# Patient Record
Sex: FEMALE | Race: WHITE | NOT HISPANIC OR LATINO | Employment: PART TIME | URBAN - METROPOLITAN AREA
[De-identification: names, ages, dates, MRNs, and addresses within clinical notes are randomized per-mention and may not be internally consistent; named-entity substitution may affect disease eponyms.]

---

## 2015-09-29 LAB
EXTERNAL HIV CONFIRMATION: NORMAL
EXTERNAL HIV SCREEN: NORMAL

## 2018-06-04 ENCOUNTER — OFFICE VISIT (OUTPATIENT)
Dept: FAMILY MEDICINE CLINIC | Facility: CLINIC | Age: 28
End: 2018-06-04
Payer: COMMERCIAL

## 2018-06-04 VITALS
RESPIRATION RATE: 18 BRPM | BODY MASS INDEX: 24.03 KG/M2 | HEART RATE: 82 BPM | HEIGHT: 66 IN | WEIGHT: 149.5 LBS | DIASTOLIC BLOOD PRESSURE: 84 MMHG | OXYGEN SATURATION: 98 % | SYSTOLIC BLOOD PRESSURE: 118 MMHG

## 2018-06-04 DIAGNOSIS — G43.109 MIGRAINE WITH AURA AND WITHOUT STATUS MIGRAINOSUS, NOT INTRACTABLE: ICD-10-CM

## 2018-06-04 DIAGNOSIS — Z76.89 ENCOUNTER TO ESTABLISH CARE: Primary | ICD-10-CM

## 2018-06-04 DIAGNOSIS — M25.562 ARTHRALGIA OF BOTH KNEES: ICD-10-CM

## 2018-06-04 DIAGNOSIS — J30.1 NON-SEASONAL ALLERGIC RHINITIS DUE TO POLLEN: ICD-10-CM

## 2018-06-04 DIAGNOSIS — M25.561 ARTHRALGIA OF BOTH KNEES: ICD-10-CM

## 2018-06-04 DIAGNOSIS — Z13.220 NEED FOR LIPID SCREENING: ICD-10-CM

## 2018-06-04 DIAGNOSIS — M06.9 RHEUMATOID ARTHRITIS INVOLVING BOTH KNEES, UNSPECIFIED RHEUMATOID FACTOR PRESENCE: ICD-10-CM

## 2018-06-04 DIAGNOSIS — J45.40 MODERATE PERSISTENT ASTHMA WITHOUT COMPLICATION: ICD-10-CM

## 2018-06-04 DIAGNOSIS — F17.210 CIGARETTE NICOTINE DEPENDENCE WITHOUT COMPLICATION: ICD-10-CM

## 2018-06-04 DIAGNOSIS — Z20.7 SCABIES EXPOSURE: ICD-10-CM

## 2018-06-04 PROBLEM — J45.20 MILD INTERMITTENT ASTHMA WITHOUT COMPLICATION: Status: ACTIVE | Noted: 2018-06-04

## 2018-06-04 PROCEDURE — 99204 OFFICE O/P NEW MOD 45 MIN: CPT | Performed by: NURSE PRACTITIONER

## 2018-06-04 RX ORDER — PERMETHRIN 50 MG/G
CREAM TOPICAL ONCE
Qty: 60 G | Refills: 0 | Status: SHIPPED | OUTPATIENT
Start: 2018-06-04 | End: 2018-06-04

## 2018-06-04 RX ORDER — PERMETHRIN 50 MG/G
CREAM TOPICAL
Refills: 0 | COMMUNITY
Start: 2018-05-22 | End: 2018-06-04 | Stop reason: SDUPTHER

## 2018-06-04 RX ORDER — FLUTICASONE PROPIONATE 220 UG/1
1 AEROSOL, METERED RESPIRATORY (INHALATION) 2 TIMES DAILY
Qty: 1 INHALER | Refills: 5 | Status: SHIPPED | OUTPATIENT
Start: 2018-06-04 | End: 2018-09-24

## 2018-06-04 RX ORDER — TOPIRAMATE 100 MG/1
100 TABLET, FILM COATED ORAL 2 TIMES DAILY
COMMUNITY
End: 2018-06-04

## 2018-06-04 RX ORDER — ALBUTEROL SULFATE 90 UG/1
2 AEROSOL, METERED RESPIRATORY (INHALATION) EVERY 6 HOURS PRN
Qty: 1 INHALER | Refills: 4 | Status: SHIPPED | OUTPATIENT
Start: 2018-06-04 | End: 2018-07-04

## 2018-06-04 RX ORDER — NAPROXEN 500 MG/1
250 TABLET ORAL 2 TIMES DAILY WITH MEALS
COMMUNITY
End: 2018-06-07 | Stop reason: SDUPTHER

## 2018-06-04 NOTE — PROGRESS NOTES
Assessment/Plan:    No problem-specific Assessment & Plan notes found for this encounter  Diagnoses and all orders for this visit:    Encounter to establish care  -     CBC and differential; Future  -     Comprehensive metabolic panel; Future  -     Vitamin D 25 hydroxy; Future  -     TSH, 3rd generation; Future  -     Lipid panel; Future  -     Sedimentation rate, automated; Future    Scabies exposure  -     permethrin (ELIMITE) 5 % cream; Apply topically once for 1 dose  -     Discontinue: permethrin (ELIMITE) 5 % cream; Apply topically once for 1 dose  -     CBC and differential; Future  -     Comprehensive metabolic panel; Future  -     Vitamin D 25 hydroxy; Future  -     Vitamin B12; Future  -     TSH, 3rd generation; Future  -     Lipid panel; Future  -     Sedimentation rate, automated; Future    Migraine with aura and without status migrainosus, not intractable  -     topiramate (TOPAMAX) 200 MG tablet; Take 1 tablet (200 mg total) by mouth 2 (two) times a day for 30 days  -     CBC and differential; Future  -     Comprehensive metabolic panel; Future  -     Vitamin D 25 hydroxy; Future  -     Vitamin B12; Future  -     TSH, 3rd generation; Future  -     Lipid panel; Future  -     Sedimentation rate, automated; Future    Moderate persistent asthma without complication  -     fluticasone (FLOVENT HFA) 220 mcg/act inhaler; Inhale 1 puff 2 (two) times a day for 30 days  -     albuterol (PROVENTIL HFA,VENTOLIN HFA) 90 mcg/act inhaler; Inhale 2 puffs every 6 (six) hours as needed for wheezing for up to 30 days  -     CBC and differential; Future  -     Comprehensive metabolic panel; Future  -     Vitamin D 25 hydroxy; Future  -     Vitamin B12; Future  -     TSH, 3rd generation; Future  -     Lipid panel; Future  -     Sedimentation rate, automated;  Future    Rheumatoid arthritis involving both knees, unspecified rheumatoid factor presence (HCC)  -     CBC and differential; Future  -     Comprehensive metabolic panel; Future  -     Vitamin D 25 hydroxy; Future  -     Vitamin B12; Future  -     TSH, 3rd generation; Future  -     Lipid panel; Future  -     Sedimentation rate, automated; Future    Arthralgia of both knees  -     Vitamin D 25 hydroxy; Future  -     Vitamin B12; Future  -     TSH, 3rd generation; Future  -     Lipid panel; Future  -     Sedimentation rate, automated; Future    Need for lipid screening  -     Lipid panel; Future  -     Sedimentation rate, automated; Future    Non-seasonal allergic rhinitis due to pollen  [facility] Allergy Panel, Adult; Future    Cigarette nicotine dependence without complication    Other orders  -     Discontinue: topiramate (TOPAMAX) 100 mg tablet; Take 100 mg by mouth 2 (two) times a day  -     naproxen (NAPROSYN) 500 mg tablet; Take 250 mg by mouth 2 (two) times a day with meals  -     Discontinue: albuterol 2 mg/5 mL syrup; Take 2 mg by mouth 3 (three) times a day  -     Discontinue: ciclesonide (ALVESCO) 160 MCG/ACT inhaler; Inhale 1 puff 2 (two) times a day  -     Discontinue: permethrin (ELIMITE) 5 % cream;           Subjective:      Patient ID: Gilmer Prakash is a 32 y o  female  Pt is here to become Established with the practice  She hasnt been seen by a doctor for a few years  She needs permit completed for her drivers physical   [facility]   Asthma- dx as a child  She does smoke  She uses the albuterol inhaler two puffs twice a day for wheezing  Uses steroid inhaler for over a year, daily  Nicotine addiction- 1 pack every 3 days  Started smoking at age 21  Migraine headaches- topamax was decreased 6 months while she was in senior care  She previously was taking 200 mg twice a day and was decreased to 100 mg twice a day  Migraine headaches occur daily  Some days are worse than occurs  She does have a visual aura prior to onset  She takes Ibuprofen 600 mg at onset of migraines   She has had migraines since a teenager  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 [employer]  She feels itchy and feels like "something is biting me"  No rash seen  She received her first treatment two weeks ago and was told to repeat in two weeks  She needs refill  Pt has intermittent pain in right side of abdomen  Pain is sharp and intermittent  Doesn't occur every day  Feels fatigued  Pt dx with RA at age 4  Mom says she stopped them because she "was too young to be taking them"  She has joint pains in hands and knees and shoulders  The following portions of the patient's history were reviewed and updated as appropriate:   She  has a past medical history of Allergic; Anxiety; Asthma; Migraine; and Pneumonia  She   Patient Active Problem List    Diagnosis Date Noted    Encounter to establish care 2018    Mild intermittent asthma without complication     Scabies exposure 2018    Migraine with aura and without status migrainosus, not intractable 2018    Moderate persistent asthma without complication     Rheumatoid arthritis involving both knees ([facility] [address] ) 2018    Arthralgia of both knees 2018    Need for lipid screening 2018    Non-seasonal allergic rhinitis due to pollen 2018    Cigarette nicotine dependence without complication      She  has a past surgical history that includes  section and Femur fracture surgery  Her family history includes Asthma in her daughter and son; Diabetes in her mother; Hypertension in her father  She  reports that she has been smoking Cigarettes  She has been smoking about 0 25 packs per day  She has never used smokeless tobacco  She reports that she does not drink alcohol or use drugs    Current Outpatient Prescriptions   Medication Sig Dispense Refill    naproxen (NAPROSYN) 500 mg tablet Take 250 mg by mouth 2 (two) times a day with meals      albuterol (PROVENTIL HFA,VENTOLIN HFA) 90 mcg/act inhaler Inhale 2 puffs every 6 (six) hours as needed for wheezing for up to 30 days 1 Inhaler 4    fluticasone (FLOVENT HFA) 220 mcg/act inhaler Inhale 1 puff 2 (two) times a day for 30 days 1 Inhaler 5    permethrin (ELIMITE) 5 % cream Apply topically once for 1 dose 60 g 0    topiramate (TOPAMAX) 200 MG tablet Take 1 tablet (200 mg total) by mouth 2 (two) times a day for 30 days 60 tablet 5     No current facility-administered medications for this visit  No current outpatient prescriptions on file prior to visit  No current facility-administered medications on file prior to visit  She is allergic to bee venom; ciprofloxacin; doxycycline; and sulfa antibiotics       Review of Systems   Constitutional: Positive for fatigue  Negative for unexpected weight change  HENT: Negative  Eyes: Negative  Respiratory: Positive for chest tightness, shortness of breath and wheezing  Negative for cough  Cardiovascular: Negative  Gastrointestinal: Positive for abdominal pain  Musculoskeletal: Negative  Multiple joint pains  Hands and knees and shoulders   Skin: Negative  Negative for rash  Neurological: Positive for headaches  Psychiatric/Behavioral: Negative  All other systems reviewed and are negative  Objective:      /84 (BP Location: Left arm, Patient Position: Sitting)   Pulse 82   Resp 18   Ht 5' 6" (1 676 m)   Wt 67 8 kg (149 lb 8 oz)   LMP 06/03/2018 (Exact Date)   SpO2 98%   BMI 24 13 kg/m²          Physical Exam   Constitutional: She is oriented to person, place, and time  She appears well-developed and well-nourished  No distress  HENT:   Head: Normocephalic and atraumatic  Right Ear: External ear normal    Left Ear: External ear normal    Nose: Nose normal    Mouth/Throat: Oropharynx is clear and moist    Eyes: Conjunctivae and EOM are normal  Pupils are equal, round, and reactive to light  Neck: Normal range of motion  Neck supple  No thyromegaly present  Cardiovascular: Normal rate, regular rhythm and normal heart sounds  No murmur heard  Pulmonary/Chest: Effort normal and breath sounds normal  No respiratory distress  She has no wheezes  Abdominal: Soft  Bowel sounds are normal  She exhibits no distension  There is tenderness  Tenderness to palpation of right lower quadrant abdomen   Musculoskeletal: Normal range of motion  She exhibits no edema, tenderness or deformity  Lymphadenopathy:     She has no cervical adenopathy  Neurological: She is alert and oriented to person, place, and time  Skin: Skin is warm and dry  No rash noted  No pallor  Psychiatric: She has a normal mood and affect  Her behavior is normal  Judgment and thought content normal    Nursing note and vitals reviewed

## 2018-06-04 NOTE — PATIENT INSTRUCTIONS
Migraine headaches- increase topamax to 200 mg bid  Asthma- moderate persistent  Change steroid inhaler, increase dosage and have pt decrease use of albuterol if possible  Drivers permit- cleared for driving  Abdominal pain- nonfocal exam  Check labs  Nicotine addiction- not ready to quit  Scabies exposure- continue second dose of permethrine  Rheumatoid arthritis - affecting multiple joints  Check RF and sed rate with labs  I will call you with your lab results

## 2018-06-05 ENCOUNTER — LAB (OUTPATIENT)
Dept: LAB | Facility: CLINIC | Age: 28
End: 2018-06-05
Payer: COMMERCIAL

## 2018-06-05 DIAGNOSIS — M25.562 ARTHRALGIA OF BOTH KNEES: ICD-10-CM

## 2018-06-05 DIAGNOSIS — J45.40 MODERATE PERSISTENT ASTHMA WITHOUT COMPLICATION: ICD-10-CM

## 2018-06-05 DIAGNOSIS — M25.561 ARTHRALGIA OF BOTH KNEES: ICD-10-CM

## 2018-06-05 DIAGNOSIS — G43.109 MIGRAINE WITH AURA AND WITHOUT STATUS MIGRAINOSUS, NOT INTRACTABLE: ICD-10-CM

## 2018-06-05 DIAGNOSIS — Z76.89 ENCOUNTER TO ESTABLISH CARE: ICD-10-CM

## 2018-06-05 DIAGNOSIS — Z20.7 SCABIES EXPOSURE: ICD-10-CM

## 2018-06-05 DIAGNOSIS — M06.9 RHEUMATOID ARTHRITIS INVOLVING BOTH KNEES, UNSPECIFIED RHEUMATOID FACTOR PRESENCE: ICD-10-CM

## 2018-06-05 DIAGNOSIS — J30.1 NON-SEASONAL ALLERGIC RHINITIS DUE TO POLLEN: ICD-10-CM

## 2018-06-05 DIAGNOSIS — Z13.220 NEED FOR LIPID SCREENING: ICD-10-CM

## 2018-06-05 LAB
25(OH)D3 SERPL-MCNC: 34.4 NG/ML (ref 30–100)
ALBUMIN SERPL BCP-MCNC: 4.2 G/DL (ref 3.5–5)
ALP SERPL-CCNC: 49 U/L (ref 46–116)
ALT SERPL W P-5'-P-CCNC: 23 U/L (ref 12–78)
ANION GAP SERPL CALCULATED.3IONS-SCNC: 6 MMOL/L (ref 4–13)
AST SERPL W P-5'-P-CCNC: 16 U/L (ref 5–45)
BASOPHILS # BLD AUTO: 0.04 THOUSANDS/ΜL (ref 0–0.1)
BASOPHILS NFR BLD AUTO: 1 % (ref 0–1)
BILIRUB SERPL-MCNC: 0.27 MG/DL (ref 0.2–1)
BUN SERPL-MCNC: 15 MG/DL (ref 5–25)
CALCIUM SERPL-MCNC: 9.1 MG/DL (ref 8.3–10.1)
CHLORIDE SERPL-SCNC: 110 MMOL/L (ref 100–108)
CHOLEST SERPL-MCNC: 119 MG/DL (ref 50–200)
CO2 SERPL-SCNC: 23 MMOL/L (ref 21–32)
CREAT SERPL-MCNC: 0.71 MG/DL (ref 0.6–1.3)
EOSINOPHIL # BLD AUTO: 0.04 THOUSAND/ΜL (ref 0–0.61)
EOSINOPHIL NFR BLD AUTO: 1 % (ref 0–6)
ERYTHROCYTE [DISTWIDTH] IN BLOOD BY AUTOMATED COUNT: 13.1 % (ref 11.6–15.1)
ERYTHROCYTE [SEDIMENTATION RATE] IN BLOOD: 16 MM/HOUR (ref 0–20)
GFR SERPL CREATININE-BSD FRML MDRD: 115 ML/MIN/1.73SQ M
GLUCOSE P FAST SERPL-MCNC: 77 MG/DL (ref 65–99)
HCT VFR BLD AUTO: 40.2 % (ref 34.8–46.1)
HDLC SERPL-MCNC: 40 MG/DL (ref 40–60)
HGB BLD-MCNC: 13.3 G/DL (ref 11.5–15.4)
IMM GRANULOCYTES # BLD AUTO: 0.01 THOUSAND/UL (ref 0–0.2)
IMM GRANULOCYTES NFR BLD AUTO: 0 % (ref 0–2)
LDLC SERPL CALC-MCNC: 65 MG/DL (ref 0–100)
LYMPHOCYTES # BLD AUTO: 1.75 THOUSANDS/ΜL (ref 0.6–4.47)
LYMPHOCYTES NFR BLD AUTO: 30 % (ref 14–44)
MCH RBC QN AUTO: 31.8 PG (ref 26.8–34.3)
MCHC RBC AUTO-ENTMCNC: 33.1 G/DL (ref 31.4–37.4)
MCV RBC AUTO: 96 FL (ref 82–98)
MONOCYTES # BLD AUTO: 0.52 THOUSAND/ΜL (ref 0.17–1.22)
MONOCYTES NFR BLD AUTO: 9 % (ref 4–12)
NEUTROPHILS # BLD AUTO: 3.4 THOUSANDS/ΜL (ref 1.85–7.62)
NEUTS SEG NFR BLD AUTO: 59 % (ref 43–75)
NONHDLC SERPL-MCNC: 79 MG/DL
NRBC BLD AUTO-RTO: 0 /100 WBCS
PLATELET # BLD AUTO: 303 THOUSANDS/UL (ref 149–390)
PMV BLD AUTO: 9.5 FL (ref 8.9–12.7)
POTASSIUM SERPL-SCNC: 4 MMOL/L (ref 3.5–5.3)
PROT SERPL-MCNC: 7.5 G/DL (ref 6.4–8.2)
RBC # BLD AUTO: 4.18 MILLION/UL (ref 3.81–5.12)
SODIUM SERPL-SCNC: 139 MMOL/L (ref 136–145)
TRIGL SERPL-MCNC: 69 MG/DL
TSH SERPL DL<=0.05 MIU/L-ACNC: 0.57 UIU/ML (ref 0.36–3.74)
VIT B12 SERPL-MCNC: 383 PG/ML (ref 100–900)
WBC # BLD AUTO: 5.76 THOUSAND/UL (ref 4.31–10.16)

## 2018-06-05 PROCEDURE — 82785 ASSAY OF IGE: CPT

## 2018-06-05 PROCEDURE — 82607 VITAMIN B-12: CPT

## 2018-06-05 PROCEDURE — 80061 LIPID PANEL: CPT

## 2018-06-05 PROCEDURE — 36415 COLL VENOUS BLD VENIPUNCTURE: CPT

## 2018-06-05 PROCEDURE — 86003 ALLG SPEC IGE CRUDE XTRC EA: CPT

## 2018-06-05 PROCEDURE — 80053 COMPREHEN METABOLIC PANEL: CPT

## 2018-06-05 PROCEDURE — 85025 COMPLETE CBC W/AUTO DIFF WBC: CPT

## 2018-06-05 PROCEDURE — 82306 VITAMIN D 25 HYDROXY: CPT

## 2018-06-05 PROCEDURE — 85652 RBC SED RATE AUTOMATED: CPT

## 2018-06-05 PROCEDURE — 84443 ASSAY THYROID STIM HORMONE: CPT

## 2018-06-05 NOTE — PROGRESS NOTES
Labs have been reviewed and are wnl   Still waiting for Vit D and allergy panel which may take a bit longer

## 2018-06-07 DIAGNOSIS — B86 SCABIES: Primary | ICD-10-CM

## 2018-06-07 DIAGNOSIS — G89.29 OTHER CHRONIC PAIN: Primary | ICD-10-CM

## 2018-06-07 DIAGNOSIS — J45.40 MODERATE PERSISTENT ASTHMA WITHOUT COMPLICATION: ICD-10-CM

## 2018-06-07 LAB
A ALTERNATA IGE QN: <0.1 KUA/I
A FUMIGATUS IGE QN: <0.1 KUA/I
ALLERGEN COMMENT: ABNORMAL
BERMUDA GRASS IGE QN: 3.29 KUA/I
BOXELDER IGE QN: 3.43 KUA/I
C HERBARUM IGE QN: <0.1 KUA/I
CAT DANDER IGE QN: <0.1 KUA/I
CMN PIGWEED IGE QN: 1.23 KUA/I
COMMON RAGWEED IGE QN: 36.9 KUA/I
COTTONWOOD IGE QN: 1.04 KUA/I
D FARINAE IGE QN: <0.1 KUA/I
D PTERONYSS IGE QN: <0.1 KUA/I
DOG DANDER IGE QN: <0.1 KUA/I
LONDON PLANE IGE QN: 1.24 KUA/I
MOUSE URINE PROT IGE QN: <0.1 KUA/I
MT JUNIPER IGE QN: 0.61 KUA/I
MUGWORT IGE QN: 2.07 KUA/I
P NOTATUM IGE QN: <0.1 KUA/I
ROACH IGE QN: <0.1 KUA/I
SHEEP SORREL IGE QN: 2.74 KUA/I
SILVER BIRCH IGE QN: 2 KUA/I
TIMOTHY IGE QN: 12.6 KUA/I
TOTAL IGE SMQN RAST: 152 KU/L (ref 0–113)
WALNUT IGE QN: 4.19 KUA/I
WHITE ASH IGE QN: 4.98 KUA/I
WHITE ELM IGE QN: 1.77 KUA/I
WHITE MULBERRY IGE QN: <0.1 KUA/I
WHITE OAK IGE QN: 2.13 KUA/I

## 2018-06-07 RX ORDER — PERMETHRIN 50 MG/G
CREAM TOPICAL ONCE
Qty: 60 G | Refills: 1 | Status: SHIPPED | OUTPATIENT
Start: 2018-06-07 | End: 2018-06-07

## 2018-06-07 RX ORDER — NAPROXEN 500 MG/1
250 TABLET ORAL 2 TIMES DAILY WITH MEALS
Qty: 60 TABLET | Refills: 2 | Status: SHIPPED | OUTPATIENT
Start: 2018-06-07 | End: 2018-08-27 | Stop reason: SDUPTHER

## 2018-06-07 RX ORDER — ALBUTEROL SULFATE 90 UG/1
2 AEROSOL, METERED RESPIRATORY (INHALATION) EVERY 6 HOURS PRN
Qty: 1 INHALER | Refills: 0 | Status: CANCELLED | OUTPATIENT
Start: 2018-06-07 | End: 2018-07-07

## 2018-06-07 NOTE — TELEPHONE ENCOUNTER
Monty saw Dellia Meth and needs these meds sent in  The inhalers were sent in to [facility] incorrectly  Called back and stated that she did get the inhalers but not the naproxin nor the cream for the scabies

## 2018-08-27 DIAGNOSIS — G89.29 OTHER CHRONIC PAIN: ICD-10-CM

## 2018-08-27 RX ORDER — NAPROXEN 500 MG/1
250 TABLET ORAL 2 TIMES DAILY WITH MEALS
Qty: 60 TABLET | Refills: 1 | Status: SHIPPED | OUTPATIENT
Start: 2018-08-27 | End: 2018-09-24

## 2018-09-12 DIAGNOSIS — G89.29 OTHER CHRONIC PAIN: ICD-10-CM

## 2018-09-12 RX ORDER — NAPROXEN 500 MG/1
250 TABLET ORAL 2 TIMES DAILY WITH MEALS
Qty: 60 TABLET | Refills: 1 | Status: SHIPPED | OUTPATIENT
Start: 2018-09-12 | End: 2018-09-12 | Stop reason: SDUPTHER

## 2018-09-12 RX ORDER — NAPROXEN 500 MG/1
500 TABLET ORAL 2 TIMES DAILY WITH MEALS
Qty: 60 TABLET | Refills: 0 | Status: SHIPPED | OUTPATIENT
Start: 2018-09-12 | End: 2018-10-10 | Stop reason: SDUPTHER

## 2018-09-24 ENCOUNTER — OFFICE VISIT (OUTPATIENT)
Dept: FAMILY MEDICINE CLINIC | Facility: CLINIC | Age: 28
End: 2018-09-24
Payer: COMMERCIAL

## 2018-09-24 VITALS
HEART RATE: 92 BPM | TEMPERATURE: 99.5 F | BODY MASS INDEX: 25.07 KG/M2 | RESPIRATION RATE: 18 BRPM | DIASTOLIC BLOOD PRESSURE: 86 MMHG | OXYGEN SATURATION: 99 % | WEIGHT: 156 LBS | SYSTOLIC BLOOD PRESSURE: 122 MMHG | HEIGHT: 66 IN

## 2018-09-24 DIAGNOSIS — L02.31 CELLULITIS AND ABSCESS OF BUTTOCK: ICD-10-CM

## 2018-09-24 DIAGNOSIS — J06.9 VIRAL UPPER RESPIRATORY TRACT INFECTION: ICD-10-CM

## 2018-09-24 DIAGNOSIS — L02.31 CUTANEOUS ABSCESS OF BUTTOCK: Primary | ICD-10-CM

## 2018-09-24 DIAGNOSIS — L03.317 CELLULITIS AND ABSCESS OF BUTTOCK: ICD-10-CM

## 2018-09-24 PROCEDURE — 3008F BODY MASS INDEX DOCD: CPT | Performed by: NURSE PRACTITIONER

## 2018-09-24 PROCEDURE — 87186 SC STD MICRODIL/AGAR DIL: CPT | Performed by: NURSE PRACTITIONER

## 2018-09-24 PROCEDURE — 10060 I&D ABSCESS SIMPLE/SINGLE: CPT | Performed by: NURSE PRACTITIONER

## 2018-09-24 PROCEDURE — 87070 CULTURE OTHR SPECIMN AEROBIC: CPT | Performed by: NURSE PRACTITIONER

## 2018-09-24 PROCEDURE — 87147 CULTURE TYPE IMMUNOLOGIC: CPT | Performed by: NURSE PRACTITIONER

## 2018-09-24 PROCEDURE — 99214 OFFICE O/P EST MOD 30 MIN: CPT | Performed by: NURSE PRACTITIONER

## 2018-09-24 PROCEDURE — 87205 SMEAR GRAM STAIN: CPT | Performed by: NURSE PRACTITIONER

## 2018-09-24 RX ORDER — SULFAMETHOXAZOLE AND TRIMETHOPRIM 800; 160 MG/1; MG/1
1 TABLET ORAL EVERY 12 HOURS SCHEDULED
Qty: 20 TABLET | Refills: 0 | Status: SHIPPED | OUTPATIENT
Start: 2018-09-24 | End: 2018-10-04

## 2018-09-24 NOTE — PATIENT INSTRUCTIONS
Abscess and cellulitis of buttock- Pt is unsure as to whether there is a sulfa allergy  She would like to proceed with Bactrim  This is used for Staph infections  Apply warm, moist soaks  If this worsens, go to ER for further eval   URI- continue abrahan berumenx

## 2018-09-24 NOTE — PROGRESS NOTES
Assessment/Plan:    No problem-specific Assessment & Plan notes found for this encounter  Diagnoses and all orders for this visit:    Cutaneous abscess of buttock  -     sulfamethoxazole-trimethoprim (BACTRIM DS) 800-160 mg per tablet; Take 1 tablet by mouth every 12 (twelve) hours for 10 days    Cellulitis and abscess of buttock  -     sulfamethoxazole-trimethoprim (BACTRIM DS) 800-160 mg per tablet; Take 1 tablet by mouth every 12 (twelve) hours for 10 days  -     mupirocin (BACTROBAN) 2 % ointment; Apply topically 2 (two) times a day for 5 days  -     Wound culture and Gram stain; Future  -     Wound culture and Gram stain    Viral upper respiratory tract infection    Other orders  -     Incision and Drainage          Subjective:      Patient ID: Shaheen Lynne is a 32 y o  female  Pt is here for evaluation of a possible spider bite to her buttock  She noticed it 1-2 days ago  He mom tried to pop it  Now, she has redness and tenderness and is hot to touch  She also has a cough  Cough is present for a few days  She would like prednisone  She tells me her entire family is sick and on abx  The following portions of the patient's history were reviewed and updated as appropriate:   She  has a past medical history of Allergic; Anxiety; Asthma; Migraine; and Pneumonia    She   Patient Active Problem List    Diagnosis Date Noted    Cellulitis and abscess of buttock 09/24/2018    Viral upper respiratory tract infection 09/24/2018    Encounter to establish care 06/04/2018    Mild intermittent asthma without complication 40/19/5531    Scabies exposure 06/04/2018    Migraine with aura and without status migrainosus, not intractable 06/04/2018    Moderate persistent asthma without complication 24/91/9696    Rheumatoid [facility] involving both knees ([facility] [address] ) 06/04/2018    Arthralgia of both knees 06/04/2018    Need for lipid screening 06/04/2018    Non-seasonal allergic rhinitis due to pollen 2018    Cigarette nicotine dependence without complication      She  has a past surgical history that includes  section and Femur fracture surgery  Her family history includes Asthma in her daughter and son; Diabetes in her mother; Hypertension in her father  She  reports that she has been smoking Cigarettes  She has been smoking about 0 25 packs per day  She has never used smokeless tobacco  She reports that she does not drink alcohol or use drugs  Current Outpatient Prescriptions   Medication Sig Dispense Refill    mupirocin (BACTROBAN) 2 % ointment Apply topically 2 (two) times a day for 5 days 22 g 0    naproxen (NAPROSYN) 500 mg tablet Take 1 tablet (500 mg total) by mouth 2 (two) times a day with meals 60 tablet 0    sulfamethoxazole-trimethoprim (BACTRIM DS) 800-160 mg per tablet Take 1 tablet by mouth every 12 (twelve) hours for 10 days 20 tablet 0    topiramate (TOPAMAX) 200 MG tablet Take 1 tablet (200 mg total) by mouth 2 (two) times a day for 30 days 60 tablet 5     No current facility-administered medications for this visit  Current Outpatient Prescriptions on File Prior to Visit   Medication Sig    naproxen (NAPROSYN) 500 mg tablet Take 1 tablet (500 mg total) by mouth 2 (two) times a day with meals    topiramate (TOPAMAX) 200 MG tablet Take 1 tablet (200 mg total) by mouth 2 (two) times a day for 30 days    [DISCONTINUED] fluticasone (FLOVENT HFA) 220 mcg/act inhaler Inhale 1 puff 2 (two) times a day for 30 days    [DISCONTINUED] naproxen (NAPROSYN) 500 mg tablet Take 0 5 tablets (250 mg total) by mouth 2 (two) times a day with meals     No current facility-administered medications on file prior to visit  She is allergic to diphenhydramine; bee venom; ciprofloxacin; doxycycline; sulfa antibiotics; and sulfasalazine       Review of Systems   Constitutional: Positive for fever  Negative for fatigue  HENT: Positive for congestion      Respiratory: Positive for cough  Cardiovascular: Negative  Gastrointestinal: Negative  Musculoskeletal: Negative  Skin: Positive for wound  insect bite on buttock    Allergic/Immunologic: Negative for immunocompromised state  Neurological: Negative for weakness  Hematological: Negative for adenopathy  All other systems reviewed and are negative  Objective:      /86 (BP Location: Left arm, Patient Position: Sitting)   Pulse 92   Temp 99 5 °F (37 5 °C)   Resp 18   Ht 5' 6" (1 676 m)   Wt 70 8 kg (156 lb)   SpO2 99%   BMI 25 18 kg/m²          Physical Exam   Constitutional: She is oriented to person, place, and time  She appears well-developed and well-nourished  No distress  HENT:   Head: Normocephalic and atraumatic  Eyes: Conjunctivae are normal  Pupils are equal, round, and reactive to light  Neck: Normal range of motion  Neck supple  Cardiovascular: Normal rate  Pulmonary/Chest: Effort normal    Abdominal: Soft  Musculoskeletal: Normal range of motion  Neurological: She is alert and oriented to person, place, and time  Skin: Skin is warm and dry  She is not diaphoretic  Psychiatric: She has a normal mood and affect  Her behavior is normal  Judgment and thought content normal        Incision and Drainage  Date/Time: 9/24/2018 7:09 PM  Performed by: Jeanne Landon by: Won Parks     Patient location:  Bedside  Consent:     Consent obtained:  Verbal    Consent given by:  Patient    Risks discussed:  Bleeding, incomplete drainage and pain    Alternatives discussed:  No treatment  Universal protocol:     Procedure explained and questions answered to patient or proxy's satisfaction: yes      Patient identity confirmed:  Verbally with patient  Location:     Type:  Abscess    Size:  Erythmatous anular abscess approx 3 inches in diameter, centrralized area of induration    Location: right buttock    Pre-procedure details:     Skin preparation:  Betadine  Anesthesia (see MAR for exact dosages): Anesthesia method:  None  Procedure details:     Complexity:  Simple    Incision types:  Stab incision    Scalpel blade:  11    Approach:  Open    Incision depth:  Skin    Drainage:  Purulent    Drainage amount:  Scant    Wound treatment:  Wound left open    Packing materials:  None  Post-procedure details:     Patient tolerance of procedure: Tolerated well, no immediate complications  Comments:      Start abx  Advised on warm soaks  Fluid too deep at this time  Advised to follow up in ER if symptoms worsen   She will require anesthesia for deeper I & D

## 2018-09-28 LAB
BACTERIA WND AEROBE CULT: ABNORMAL
BACTERIA WND AEROBE CULT: ABNORMAL
GRAM STN SPEC: ABNORMAL
GRAM STN SPEC: ABNORMAL

## 2018-10-10 DIAGNOSIS — G89.29 OTHER CHRONIC PAIN: ICD-10-CM

## 2018-10-10 RX ORDER — NAPROXEN 500 MG/1
500 TABLET ORAL 2 TIMES DAILY WITH MEALS
Qty: 60 TABLET | Refills: 1 | Status: SHIPPED | OUTPATIENT
Start: 2018-10-10 | End: 2019-01-17

## 2018-10-31 ENCOUNTER — OFFICE VISIT (OUTPATIENT)
Dept: FAMILY MEDICINE CLINIC | Facility: CLINIC | Age: 28
End: 2018-10-31
Payer: COMMERCIAL

## 2018-10-31 VITALS
HEIGHT: 66 IN | DIASTOLIC BLOOD PRESSURE: 82 MMHG | WEIGHT: 162 LBS | SYSTOLIC BLOOD PRESSURE: 124 MMHG | RESPIRATION RATE: 18 BRPM | BODY MASS INDEX: 26.03 KG/M2 | TEMPERATURE: 98.6 F | HEART RATE: 88 BPM | OXYGEN SATURATION: 97 %

## 2018-10-31 DIAGNOSIS — Z23 NEED FOR INFLUENZA VACCINATION: ICD-10-CM

## 2018-10-31 DIAGNOSIS — Z23 NEED FOR TDAP VACCINATION: ICD-10-CM

## 2018-10-31 DIAGNOSIS — J06.9 URI WITH COUGH AND CONGESTION: Primary | ICD-10-CM

## 2018-10-31 PROCEDURE — 99214 OFFICE O/P EST MOD 30 MIN: CPT | Performed by: NURSE PRACTITIONER

## 2018-10-31 PROCEDURE — 90472 IMMUNIZATION ADMIN EACH ADD: CPT

## 2018-10-31 PROCEDURE — 90471 IMMUNIZATION ADMIN: CPT

## 2018-10-31 PROCEDURE — 90715 TDAP VACCINE 7 YRS/> IM: CPT

## 2018-10-31 PROCEDURE — 90686 IIV4 VACC NO PRSV 0.5 ML IM: CPT

## 2018-10-31 RX ORDER — FLUTICASONE PROPIONATE 220 UG/1
1 AEROSOL, METERED RESPIRATORY (INHALATION) 2 TIMES DAILY
Refills: 4 | COMMUNITY
Start: 2018-10-11 | End: 2019-06-11 | Stop reason: SDUPTHER

## 2018-10-31 RX ORDER — PREDNISONE 20 MG/1
TABLET ORAL
Qty: 21 TABLET | Refills: 0 | Status: SHIPPED | OUTPATIENT
Start: 2018-10-31 | End: 2018-11-13

## 2018-10-31 RX ORDER — AMOXICILLIN AND CLAVULANATE POTASSIUM 875; 125 MG/1; MG/1
1 TABLET, FILM COATED ORAL EVERY 12 HOURS SCHEDULED
Qty: 14 TABLET | Refills: 0 | Status: SHIPPED | OUTPATIENT
Start: 2018-10-31 | End: 2018-11-07

## 2018-10-31 NOTE — PROGRESS NOTES
Assessment/Plan:    No problem-specific Assessment & Plan notes found for this encounter  Diagnoses and all orders for this visit:    URI with cough and congestion  -     amoxicillin-clavulanate (AUGMENTIN) 875-125 mg per tablet; Take 1 tablet by mouth every 12 (twelve) hours for 7 days  -     predniSONE 20 mg tablet; Take 1 tab twice a day for three days then 1 tab daily for three days then 1 tab every other day until gone    Need for influenza vaccination  -     SYRINGE/SINGLE-DOSE VIAL: influenza vaccine, [id number], quadrivalent, 0 5 mL, preservative-free, for patients 3+ yr (FLUZONE)    Need for Tdap vaccination  -     TDAP VACCINE GREATER THAN OR EQUAL TO 6YO IM    Other orders  -     topiramate (TOPAMAX) 200 MG tablet; 200 mg 2 (two) times a day  -     VENTOLIN  (90 Base) MCG/ACT inhaler; INHALE 2 PUFFS EVERY 6 HOURS AS NEEDED FOR WHEEZING  -     FLOVENT  MCG/ACT inhaler; Inhale 1 puff 2 (two) times a day          Subjective:      Patient ID: Elise Rodriguez is a 32 y o  female  Pt is here with cough and cold symptoms  Pt has had symptoms for about one week  She started with cough and chest congestion  Over the past two days she has developed severe nasal congestion  She has been taking Dayquil and Nyquil and severe cold and Flu         The following portions of the patient's history were reviewed and updated as appropriate:   She  has a past medical history of Allergic; Anxiety; Asthma; Migraine; and Pneumonia    She   Patient Active Problem List    Diagnosis Date Noted    Need for influenza vaccination 10/31/2018    Need for Tdap vaccination 10/31/2018    Cellulitis and abscess of buttock 09/24/2018    URI with cough and congestion 09/24/2018    Encounter to establish care 06/04/2018    Mild intermittent asthma without complication 79/71/1638    Scabies exposure 06/04/2018    Migraine with aura and without status migrainosus, not intractable 06/04/2018    Moderate persistent asthma without complication     Rheumatoid arthritis involving both knees ([facility] [address] ) 2018    Arthralgia of both knees 2018    Need for lipid screening 2018    Non-seasonal allergic rhinitis due to pollen 2018    Cigarette nicotine dependence without complication      She  has a past surgical history that includes  section and Femur fracture surgery  Her family history includes Asthma in her daughter and son; Diabetes in her mother; Hypertension in her father  She  reports that she has been smoking Cigarettes  She has been smoking about 0 25 packs per day  She has never used smokeless tobacco  She reports that she does not drink alcohol or use drugs  Current Outpatient Prescriptions   Medication Sig Dispense Refill    amoxicillin-clavulanate (AUGMENTIN) 875-125 mg per tablet Take 1 tablet by mouth every 12 (twelve) hours for 7 days 14 tablet 0    FLOVENT  MCG/ACT inhaler Inhale 1 puff 2 (two) times a day  4    mupirocin (BACTROBAN) 2 % ointment Apply topically 2 (two) times a day for 5 days 22 g 0    naproxen (NAPROSYN) 500 mg tablet Take 1 tablet (500 mg total) by mouth 2 (two) times a day with meals 60 tablet 1    predniSONE 20 mg tablet Take 1 tab twice a day for three days then 1 tab daily for three days then 1 tab every other day until gone 21 tablet 0    topiramate (TOPAMAX) 200 MG tablet Take 1 tablet (200 mg total) by mouth 2 (two) times a day for 30 days 60 tablet 5    topiramate (TOPAMAX) 200 MG tablet 200 mg 2 (two) times a day  4    VENTOLIN  (90 Base) MCG/ACT inhaler INHALE 2 PUFFS EVERY 6 HOURS AS NEEDED FOR WHEEZING  3     No current facility-administered medications for this visit        Current Outpatient Prescriptions on File Prior to Visit   Medication Sig    mupirocin (BACTROBAN) 2 % ointment Apply topically 2 (two) times a day for 5 days    naproxen (NAPROSYN) 500 mg tablet Take 1 tablet (500 mg total) by mouth 2 (two) times a day with meals    topiramate (TOPAMAX) 200 MG tablet Take 1 tablet (200 mg total) by mouth 2 (two) times a day for 30 days     No current facility-administered medications on file prior to visit  She is allergic to diphenhydramine; bee venom; ciprofloxacin; doxycycline; and sulfasalazine       Review of Systems   Constitutional: Positive for chills  Negative for fatigue and fever  HENT: Positive for congestion, postnasal drip, sinus pain, sinus pressure and sore throat  Negative for ear pain  Respiratory: Positive for cough and chest tightness  Negative for shortness of breath  Cardiovascular: Negative for chest pain and palpitations  Gastrointestinal: Negative for abdominal pain  Skin: Negative for color change, pallor and rash  Allergic/Immunologic: Negative for immunocompromised state  Neurological: Positive for headaches  Hematological: Negative for adenopathy  All other systems reviewed and are negative  Objective:      /82 (BP Location: Left arm, Patient Position: Sitting)   Pulse 88   Temp 98 6 °F (37 °C)   Resp 18   Ht 5' 6" (1 676 m)   Wt 73 5 kg (162 lb)   SpO2 97%   BMI 26 15 kg/m²          Physical Exam   Constitutional: She is oriented to person, place, and time  She appears well-developed and well-nourished  No distress  Looks sick   HENT:   Head: Normocephalic and atraumatic  Right Ear: External ear normal    Left Ear: External ear normal    Nose: Nose normal    Mouth/Throat: Oropharynx is clear and moist  No oropharyngeal exudate  Severe nasal  congestion, turbinates are red and swollen  Eyes: Pupils are equal, round, and reactive to light  Conjunctivae and EOM are normal    Neck: Normal range of motion  Neck supple  Cardiovascular: Normal rate, regular rhythm and normal heart sounds  Exam reveals no gallop and no friction rub  No murmur heard  Pulmonary/Chest: Effort normal and breath sounds normal  No respiratory distress   She has no wheezes  She has no rales  Abdominal: Soft  Musculoskeletal: Normal range of motion  Lymphadenopathy:     She has no cervical adenopathy  Neurological: She is alert and oriented to person, place, and time  Skin: Skin is warm and dry  No rash noted  She is not diaphoretic  Psychiatric: She has a normal mood and affect  Her behavior is normal  Judgment and thought content normal    Nursing note and vitals reviewed

## 2018-10-31 NOTE — PATIENT INSTRUCTIONS
URI with cough and congestion- star antibiotics as ordered  Continue over the counter medicine for symptom management  Drink plenty of water to help thin mucous

## 2018-10-31 NOTE — LETTER
October 31, 2018     Patient: Karly Figueroa   YOB: 1990   Date of Visit: 10/31/2018       To Whom it May Concern:    Linda Diaz is under my professional care  She was seen in my office on 10/31/2018  She may return to work on 10-31-18  If you have any questions or concerns, please don't hesitate to call           Sincerely,          ADORE Hirsch Ma        CC: No Recipients

## 2018-11-13 ENCOUNTER — OFFICE VISIT (OUTPATIENT)
Dept: FAMILY MEDICINE CLINIC | Facility: CLINIC | Age: 28
End: 2018-11-13
Payer: COMMERCIAL

## 2018-11-13 VITALS
TEMPERATURE: 97.8 F | DIASTOLIC BLOOD PRESSURE: 82 MMHG | HEART RATE: 94 BPM | BODY MASS INDEX: 26.68 KG/M2 | OXYGEN SATURATION: 98 % | RESPIRATION RATE: 18 BRPM | SYSTOLIC BLOOD PRESSURE: 116 MMHG | HEIGHT: 66 IN | WEIGHT: 166 LBS

## 2018-11-13 DIAGNOSIS — B37.0 ORAL THRUSH: Primary | ICD-10-CM

## 2018-11-13 DIAGNOSIS — J32.9 RECURRENT SINUSITIS: ICD-10-CM

## 2018-11-13 PROCEDURE — 3008F BODY MASS INDEX DOCD: CPT | Performed by: NURSE PRACTITIONER

## 2018-11-13 PROCEDURE — 99214 OFFICE O/P EST MOD 30 MIN: CPT | Performed by: NURSE PRACTITIONER

## 2018-11-13 RX ORDER — CIPROFLOXACIN 500 MG/1
500 TABLET, FILM COATED ORAL EVERY 12 HOURS SCHEDULED
Qty: 14 TABLET | Refills: 0 | Status: SHIPPED | OUTPATIENT
Start: 2018-11-13 | End: 2018-11-20

## 2018-11-13 NOTE — PROGRESS NOTES
Assessment/Plan:    No problem-specific Assessment & Plan notes found for this encounter  Diagnoses and all orders for this visit:    Oral thrush  -     nystatin (MYCOSTATIN) 100,000 units/mL suspension; Apply 5 mL (500,000 Units total) to the mouth or throat 4 (four) times a day for 7 days    Recurrent sinusitis  -     ciprofloxacin (CIPRO) 500 mg tablet; Take 1 tablet (500 mg total) by mouth every 12 (twelve) hours for 7 days          Subjective:      Patient ID: Agatha Lindsey is a 32 y o  female  Pt was seen on 10/31 and treated with abx and prednisone for URI with cough and congestion  Pt is here because she continues to have sinus congestion  Her cough is resolved  She is taking Dayquil daily  She has more nasal stuffiness  The following portions of the patient's history were reviewed and updated as appropriate:   She  has a past medical history of Allergic; Anxiety; Asthma; Migraine; and Pneumonia  She   Patient Active Problem List    Diagnosis Date Noted    Oral thrush 2018    Recurrent sinusitis 2018    Need for influenza vaccination 10/31/2018    Need for Tdap vaccination 10/31/2018    Cellulitis and abscess of buttock 2018    URI with cough and congestion 2018    Encounter to establish care 2018    Mild intermittent asthma without complication     Scabies exposure 2018    Migraine with aura and without status migrainosus, not intractable 2018    Moderate persistent asthma without complication     Rheumatoid arthritis involving both knees ([address] ) 2018    Arthralgia of both knees 2018    Need for lipid screening 2018    Non-seasonal allergic rhinitis due to pollen 2018    Cigarette nicotine dependence without complication      She  has a past surgical history that includes  section and Femur fracture surgery    Her family history includes Asthma in her daughter and son; Diabetes in her mother; Hypertension in her father  She  reports that she has been smoking Cigarettes  She has been smoking about 0 25 packs per day  She has never used smokeless tobacco  She reports that she does not drink alcohol or use drugs  Current Outpatient Prescriptions   Medication Sig Dispense Refill    ciprofloxacin (CIPRO) 500 mg tablet Take 1 tablet (500 mg total) by mouth every 12 (twelve) hours for 7 days 14 tablet 0    FLOVENT  MCG/ACT inhaler Inhale 1 puff 2 (two) times a day  4    mupirocin (BACTROBAN) 2 % ointment Apply topically 2 (two) times a day for 5 days 22 g 0    naproxen (NAPROSYN) 500 mg tablet Take 1 tablet (500 mg total) by mouth 2 (two) times a day with meals 60 tablet 1    nystatin (MYCOSTATIN) 100,000 units/mL suspension Apply 5 mL (500,000 Units total) to the mouth or throat 4 (four) times a day for 7 days 60 mL 0    topiramate (TOPAMAX) 200 MG tablet Take 1 tablet (200 mg total) by mouth 2 (two) times a day for 30 days 60 tablet 5    topiramate (TOPAMAX) 200 MG tablet 200 mg 2 (two) times a day  4    VENTOLIN  (90 Base) MCG/ACT inhaler INHALE 2 PUFFS EVERY 6 HOURS AS NEEDED FOR WHEEZING  3     No current facility-administered medications for this visit        Current Outpatient Prescriptions on File Prior to Visit   Medication Sig    FLOVENT  MCG/ACT inhaler Inhale 1 puff 2 (two) times a day    mupirocin (BACTROBAN) 2 % ointment Apply topically 2 (two) times a day for 5 days    naproxen (NAPROSYN) 500 mg tablet Take 1 tablet (500 mg total) by mouth 2 (two) times a day with meals    topiramate (TOPAMAX) 200 MG tablet Take 1 tablet (200 mg total) by mouth 2 (two) times a day for 30 days    topiramate (TOPAMAX) 200 MG tablet 200 mg 2 (two) times a day    VENTOLIN  (90 Base) MCG/ACT inhaler INHALE 2 PUFFS EVERY 6 HOURS AS NEEDED FOR WHEEZING    [DISCONTINUED] predniSONE 20 mg tablet Take 1 tab twice a day for three days then 1 tab daily for three days then 1 tab every other day until gone     No current facility-administered medications on file prior to visit  She is allergic to diphenhydramine; bee venom; ciprofloxacin; doxycycline; and sulfasalazine       Review of Systems   Constitutional: Negative for fatigue and fever  HENT: Positive for congestion, postnasal drip, sinus pain, sinus pressure and sore throat  Negative for ear pain  Respiratory: Negative for cough, chest tightness and shortness of breath  Cardiovascular: Negative for chest pain and palpitations  Gastrointestinal: Negative for abdominal pain  Skin: Negative for color change, pallor and rash  Allergic/Immunologic: Negative for immunocompromised state  Neurological: Negative for headaches  Hematological: Negative for adenopathy  All other systems reviewed and are negative  Objective:      /82 (BP Location: Left arm, Patient Position: Sitting)   Pulse 94   Temp 97 8 °F (36 6 °C)   Resp 18   Ht 5' 6" (1 676 m)   Wt 75 3 kg (166 lb)   SpO2 98%   BMI 26 79 kg/m²          Physical Exam   Constitutional: She is oriented to person, place, and time  She appears well-developed and well-nourished  No distress  HENT:   Head: Normocephalic and atraumatic  Right Ear: External ear normal    Left Ear: External ear normal    Nose: Nose normal    Mouth/Throat: Oropharynx is clear and moist  No oropharyngeal exudate    + nasal sinus congestion  + thick white coating on tongue and post soft palate   Eyes: Pupils are equal, round, and reactive to light  Conjunctivae are normal    Neck: Normal range of motion  Neck supple  Cardiovascular: Normal rate, regular rhythm and normal heart sounds  Exam reveals no gallop and no friction rub  No murmur heard  Pulmonary/Chest: Effort normal and breath sounds normal  No respiratory distress  She has no wheezes  She has no rales  Abdominal: Soft  Musculoskeletal: Normal range of motion     Lymphadenopathy: She has no cervical adenopathy  Neurological: She is alert and oriented to person, place, and time  Skin: Skin is warm and dry  No rash noted  She is not diaphoretic  Psychiatric: She has a normal mood and affect  Her behavior is normal  Judgment and thought content normal    Nursing note and vitals reviewed

## 2018-11-13 NOTE — PATIENT INSTRUCTIONS
Recurrent sinusitis- start ciprofloxacin  Start nasal sinus rinse twice a day  Oral thrush- start Nystatin  Swish and swallow  Oral Candidiasis   AMBULATORY CARE:   Oral candidiasis , or thrush, is a fungal infection that affects the inside of your mouth  Seek care immediately if:   · You have trouble swallowing and your jaw and neck are stiff  · You are dizzy, thirsty, or have a dry mouth  · You are urinating little or not at all  · You cannot eat or drink because of the pain  Contact your healthcare provider if:   · You have a fever  · You have nausea, vomiting, or diarrhea  · Your signs and symptoms get worse, even after treatment  · You have questions or concerns about your condition or care  Common symptoms include the following:   · White or whitish-yellow patches in the mouth that look like milk curds    · Redness or bleeding under the patches    · Sore and painful mouth, with cracking or tearing on the corners    · Bright red tongue that may feel like it is burning    · Trouble swallowing and tasting    · Swelling under dentures  Treatment for oral candidiasis  includes antifungal medicine that helps kill the fungus that caused your oral candidiasis  This medicine may be a pill or a solution that you gargle  Remove dentures before you gargle  Prevent oral candidiasis:  Brush your teeth, gums, and tongue after you eat and before you go to sleep  Use a toothbrush with soft bristles  See your dentist for regular exams  Remove your dentures when you sleep, or at least 6 hours each day  Clean your dentures and soak them in denture   Let them air dry after soaking  Follow up with your healthcare provider as directed:  Write down your questions so you remember to ask them during your visits  © 2017 [employer] Information is for End User's use only and may not be sold, redistributed or otherwise used for commercial purposes   All illustrations and images included in [employer] are the copyrighted property of [employer]  or Ronald Santana  The above information is an  only  It is not intended as medical advice for individual conditions or treatments  Talk to your doctor, nurse or pharmacist before following any medical regimen to see if it is safe and effective for you

## 2018-12-03 DIAGNOSIS — G43.909 MIGRAINE WITHOUT STATUS MIGRAINOSUS, NOT INTRACTABLE, UNSPECIFIED MIGRAINE TYPE: Primary | ICD-10-CM

## 2019-01-05 DIAGNOSIS — G89.29 OTHER CHRONIC PAIN: ICD-10-CM

## 2019-01-07 RX ORDER — NAPROXEN 500 MG/1
250 TABLET ORAL 2 TIMES DAILY WITH MEALS
Qty: 60 TABLET | Refills: 1 | Status: SHIPPED | OUTPATIENT
Start: 2019-01-07 | End: 2019-02-14 | Stop reason: ALTCHOICE

## 2019-01-15 ENCOUNTER — HOSPITAL ENCOUNTER (EMERGENCY)
Facility: HOSPITAL | Age: 29
Discharge: HOME | End: 2019-01-15
Attending: EMERGENCY MEDICINE
Payer: COMMERCIAL

## 2019-01-15 ENCOUNTER — APPOINTMENT (EMERGENCY)
Dept: RADIOLOGY | Facility: HOSPITAL | Age: 29
End: 2019-01-15
Payer: COMMERCIAL

## 2019-01-15 ENCOUNTER — HOSPITAL ENCOUNTER (EMERGENCY)
Facility: HOSPITAL | Age: 29
Discharge: HOME | End: 2019-01-15
Attending: EMERGENCY MEDICINE | Admitting: EMERGENCY MEDICINE
Payer: COMMERCIAL

## 2019-01-15 VITALS
BODY MASS INDEX: 27.32 KG/M2 | HEIGHT: 66 IN | WEIGHT: 170 LBS | OXYGEN SATURATION: 100 % | RESPIRATION RATE: 16 BRPM | DIASTOLIC BLOOD PRESSURE: 74 MMHG | SYSTOLIC BLOOD PRESSURE: 122 MMHG | HEART RATE: 72 BPM

## 2019-01-15 VITALS
SYSTOLIC BLOOD PRESSURE: 145 MMHG | RESPIRATION RATE: 16 BRPM | OXYGEN SATURATION: 99 % | HEART RATE: 74 BPM | TEMPERATURE: 98.4 F | DIASTOLIC BLOOD PRESSURE: 88 MMHG

## 2019-01-15 DIAGNOSIS — S53.105A DISLOCATION OF LEFT ELBOW, INITIAL ENCOUNTER: Primary | ICD-10-CM

## 2019-01-15 DIAGNOSIS — S53.125S: Primary | ICD-10-CM

## 2019-01-15 PROCEDURE — 73090 X-RAY EXAM OF FOREARM: CPT

## 2019-01-15 PROCEDURE — 73060 X-RAY EXAM OF HUMERUS: CPT

## 2019-01-15 PROCEDURE — 96376 TX/PRO/DX INJ SAME DRUG ADON: CPT

## 2019-01-15 PROCEDURE — 99283 EMERGENCY DEPT VISIT LOW MDM: CPT

## 2019-01-15 PROCEDURE — 96375 TX/PRO/DX INJ NEW DRUG ADDON: CPT

## 2019-01-15 PROCEDURE — 96374 THER/PROPH/DIAG INJ IV PUSH: CPT

## 2019-01-15 PROCEDURE — 73070 X-RAY EXAM OF ELBOW: CPT

## 2019-01-15 PROCEDURE — 73080 X-RAY EXAM OF ELBOW: CPT

## 2019-01-15 RX ORDER — OXYCODONE HYDROCHLORIDE AND ACETAMINOPHEN 5; 325 MG/1; MG/1
1 TABLET ORAL EVERY 4 HOURS PRN
Qty: 11 TABLET | Refills: 0 | Status: SHIPPED | OUTPATIENT
Start: 2019-01-15 | End: 2019-08-09

## 2019-01-15 RX ORDER — HYDROMORPHONE HCL/PF 1 MG/ML
0.5 SYRINGE (ML) INJECTION ONCE
Status: COMPLETED | OUTPATIENT
Start: 2019-01-15 | End: 2019-01-15

## 2019-01-15 RX ORDER — HYDROMORPHONE HCL/PF 1 MG/ML
0.5 SYRINGE (ML) INJECTION ONCE
Status: DISCONTINUED | OUTPATIENT
Start: 2019-01-15 | End: 2019-01-15 | Stop reason: HOSPADM

## 2019-01-15 RX ORDER — OXYCODONE HYDROCHLORIDE AND ACETAMINOPHEN 5; 325 MG/1; MG/1
1 TABLET ORAL ONCE
Status: DISCONTINUED | OUTPATIENT
Start: 2019-01-15 | End: 2019-01-15 | Stop reason: HOSPADM

## 2019-01-15 RX ORDER — LIDOCAINE HYDROCHLORIDE 20 MG/ML
10 INJECTION, SOLUTION EPIDURAL; INFILTRATION; INTRACAUDAL; PERINEURAL ONCE
Status: COMPLETED | OUTPATIENT
Start: 2019-01-15 | End: 2019-01-15

## 2019-01-15 RX ORDER — NAPROXEN 375 MG/1
375 TABLET ORAL 2 TIMES DAILY WITH MEALS
Qty: 30 TABLET | Refills: 0 | Status: SHIPPED | OUTPATIENT
Start: 2019-01-15 | End: 2019-02-14

## 2019-01-15 RX ORDER — HYDROMORPHONE HCL/PF 1 MG/ML
1 SYRINGE (ML) INJECTION ONCE
Status: COMPLETED | OUTPATIENT
Start: 2019-01-15 | End: 2019-01-15

## 2019-01-15 RX ORDER — ONDANSETRON 2 MG/ML
4 INJECTION INTRAMUSCULAR; INTRAVENOUS ONCE
Status: COMPLETED | OUTPATIENT
Start: 2019-01-15 | End: 2019-01-15

## 2019-01-15 RX ADMIN — HYDROMORPHONE HYDROCHLORIDE 1 MG: 1 INJECTION, SOLUTION INTRAMUSCULAR; INTRAVENOUS; SUBCUTANEOUS at 18:50

## 2019-01-15 RX ADMIN — LIDOCAINE HYDROCHLORIDE 10 ML: 20 INJECTION, SOLUTION EPIDURAL; INFILTRATION; INTRACAUDAL; PERINEURAL at 19:24

## 2019-01-15 RX ADMIN — LIDOCAINE HYDROCHLORIDE 10 ML: 20 INJECTION, SOLUTION EPIDURAL; INFILTRATION; INTRACAUDAL; PERINEURAL at 11:41

## 2019-01-15 RX ADMIN — HYDROMORPHONE HYDROCHLORIDE 0.5 MG: 1 INJECTION, SOLUTION INTRAMUSCULAR; INTRAVENOUS; SUBCUTANEOUS at 11:51

## 2019-01-15 RX ADMIN — HYDROMORPHONE HYDROCHLORIDE 0.5 MG: 1 INJECTION, SOLUTION INTRAMUSCULAR; INTRAVENOUS; SUBCUTANEOUS at 11:40

## 2019-01-15 RX ADMIN — HYDROMORPHONE HYDROCHLORIDE 0.5 MG: 1 INJECTION, SOLUTION INTRAMUSCULAR; INTRAVENOUS; SUBCUTANEOUS at 19:37

## 2019-01-15 RX ADMIN — HYDROMORPHONE HYDROCHLORIDE 0.5 MG: 1 INJECTION, SOLUTION INTRAMUSCULAR; INTRAVENOUS; SUBCUTANEOUS at 20:29

## 2019-01-15 RX ADMIN — ONDANSETRON 4 MG: 2 INJECTION INTRAMUSCULAR; INTRAVENOUS at 11:40

## 2019-01-15 NOTE — DISCHARGE INSTRUCTIONS
Elbow Dislocation   WHAT YOU NEED TO KNOW:   An elbow dislocation happens when the bones in the elbow are pulled apart  This causes stretching or tearing of the ligaments that hold the bones together in the elbow joint  DISCHARGE INSTRUCTIONS:   Medicine: You may need any of the following:  · NSAIDs  help decrease swelling, pain, and fever  This medicine is available without a doctor's order  NSAIDs can cause stomach bleeding or kidney problems  If you take blood thinner medicine, always ask your healthcare provider if NSAIDs are safe for you  Always read the medicine label and follow directions  · Acetaminophen  decreases pain and fever  It is available without a doctor's order  Ask how much to take and how often to take it  Follow directions  Acetaminophen can cause liver damage if not taken correctly  · Take your medicine as directed  Contact your healthcare provider if you think your medicine is not helping or if you have side effects  Tell him or her if you are allergic to any medicine  Keep a list of the medicines, vitamins, and herbs you take  Include the amounts, and when and why you take them  Bring the list or the pill bottles to follow-up visits  Carry your medicine list with you in case of an emergency  Self-care:   · Immobilize your elbow  as directed  You may need to wear a splint on your arm to keep the bones from moving  This helps decrease pain and allows your elbow to heal  You may be told to wear the splint during the day and at night  Put a folded wash cloth under your armpit to help make your arm and hand more comfortable  Ask if you can remove the splint for bathing or showering  · Ice  your elbow for 15 to 20 minutes every hour or as directed  Use an ice pack, or put crushed ice in a plastic bag  Cover it with a towel  Ice helps prevent tissue damage and decreases swelling and pain  · Elevate  your elbow above the level of your heart as often as you can   This will help decrease swelling and pain  Prop your elbow on pillows or blankets to keep it elevated comfortably  · Physical therapy  may be recommended  A physical therapist teaches you exercises to help improve movement and strength, and to decrease pain  Contact your healthcare provider if:   · Your pain or swelling gets worse  · You have trouble moving your elbow after your injury  · The bones in your elbow pop in and out of place more than once  · You have questions or concerns about your condition or care  Return to the emergency department if:   · Your arm feels numb or cold and looks pale  © 2017 [facility] Information is for End User's use only and may not be sold, redistributed or otherwise used for commercial purposes  All illustrations and images included in CareNotes® are the copyrighted property of [employer]  or Ronald Santana  The above information is an  only  It is not intended as medical advice for individual conditions or treatments  Talk to your doctor, nurse or pharmacist before following any medical regimen to see if it is safe and effective for you

## 2019-01-15 NOTE — ED PROVIDER NOTES
Pt Name: Martell Dewey  MRN: [id number]  [place] 1990  Age/Sex: 25 y o  female  Date of evaluation: 1/15/2019  PCP: Nancy Hernandez, [address] [place] [address]    Chief Complaint   Patient presents with    Elbow Injury     pt was walking dog, dog pulled pt, pt landed on left elbow  HPI    25 y o  female presenting with left elbow pain, swelling, deformity  Patient states she was walking her dog last night when the dog pulled her down a short flight of stairs, approximately 4, whereupon she fell onto an outstretched arm  She had immediate pain and deformity elbow and has been unable to extend the arm since  She was unable to come to the ER until now due to children and a sick grandmother in her house who could not be left unattended  She complains of some tingling in the fingers of the left hand as well as swelling pain and deformity of the left elbow, the pain is sharp, severe, radiating up and down the arm, worse with any movement of the elbow and better at rest   She denies any other injuries or symptoms  HPI      Past Medical and Surgical History    Past Medical History:   Diagnosis Date    Allergic     Anxiety     Asthma     Migraine     Pneumonia        Past Surgical History:   Procedure Laterality Date     SECTION      FEMUR FRACTURE SURGERY         Family History   Problem Relation Age of Onset    Diabetes Mother     Hypertension Father     Asthma Son     Asthma Daughter        Social History   Substance Use Topics    Smoking status: Current Every Day Smoker     Packs/day: 0 25     Types: Cigarettes    Smokeless tobacco: Never Used    Alcohol use No           Allergies    Allergies   Allergen Reactions    Diphenhydramine      Other reaction(s): DIPHENHYDRAMINE (BENADRYL)    Bee Venom Tongue Swelling    Ciprofloxacin     Doxycycline     Sulfasalazine        Home Medications    Prior to Admission medications    Medication Sig Start Date End Date Taking? Authorizing Provider   [facility]  MCG/ACT inhaler Inhale 1 puff 2 (two) times a day 10/11/18   Historical Provider, MD   mupirocin (BACTROBAN) 2 % ointment Apply topically 2 (two) times a day for 5 days 9/24/18 9/29/18  Laura SeatsADORE   naproxen (NAPROSYN) 500 mg tablet Take 1 tablet (500 mg total) by mouth 2 (two) times a day with meals 10/10/18   Que Caraballo MD   naproxen (NAPROSYN) 500 mg tablet Take 0 5 tablets (250 mg total) by mouth 2 (two) times a day with meals 1/7/19   Que Caraballo MD   topiramate (TOPAMAX) 200 MG tablet Take 1 tablet (200 mg total) by mouth 2 (two) times a day for 30 days 6/4/18 7/4/18  Laura SeatsADORE   topiramate (TOPAMAX) 200 MG tablet Take 1 tablet (200 mg total) by mouth 2 (two) times a day 12/3/18   Laura SeatADORE johns   VENTOLIN  (90 Base) MCG/ACT inhaler INHALE 2 PUFFS EVERY 6 HOURS AS NEEDED FOR WHEEZING 10/11/18   Historical Provider, MD           Review of Systems    Review of Systems   Constitutional: Negative for activity change, chills and fever  HENT: Negative for drooling and facial swelling  Eyes: Negative for pain, discharge and visual disturbance  Respiratory: Negative for apnea, cough, chest tightness, shortness of breath and wheezing  Cardiovascular: Negative for chest pain and leg swelling  Gastrointestinal: Negative for abdominal pain, constipation, diarrhea, nausea and vomiting  Genitourinary: Negative for difficulty urinating, dysuria and urgency  Musculoskeletal: Positive for arthralgias and joint swelling  Negative for back pain and gait problem  Skin: Negative for color change and rash  Neurological: Negative for dizziness, speech difficulty, weakness and headaches  Psychiatric/Behavioral: Negative for agitation, behavioral problems and confusion  All other systems reviewed and negative      Physical Exam      ED Triage Vitals   Temp Pulse Respirations Blood Pressure SpO2   -- 01/15/19 1107 01/15/19 1107 01/15/19 1107 01/15/19 1107    103 22 169/89 96 %      Temp src Heart Rate Source Patient Position - Orthostatic VS BP Location FiO2 (%)   -- 01/15/19 1148 01/15/19 1107 01/15/19 1107 --    Monitor Sitting Right arm       Pain Score       01/15/19 1107       Worst Possible Pain               Physical Exam   Constitutional: She is oriented to person, place, and time  She appears well-developed and well-nourished  She appears distressed  HENT:   Head: Normocephalic and atraumatic  Nose: Nose normal    Mouth/Throat: Oropharynx is clear and moist    Eyes: Pupils are equal, round, and reactive to light  Conjunctivae and EOM are normal    Neck: Normal range of motion  Neck supple  Cardiovascular: Normal rate, regular rhythm, normal heart sounds and intact distal pulses  Pulmonary/Chest: Effort normal and breath sounds normal  No respiratory distress  She has no wheezes  She has no rales  Abdominal: Soft  She exhibits no distension  There is no tenderness  There is no rebound and no guarding  Musculoskeletal: She exhibits edema, tenderness and deformity  Range of motion significantly decreased in left elbow  Left elbow significantly swollen and deformed, palpable gap between the humerus and the olecranon process  The severe pain with any ranging of the elbow  No tenderness to palpation clavicle, the shoulder, the forearm, the wrist or hand  Strength cap refill and 2+ radial pulses all intact in the left hand, patient endorsing a pins and needle sensation but still has sensation to light touch in all fingers  Neurological: She is alert and oriented to person, place, and time  Skin: Skin is warm and dry  No rash noted  No erythema  Psychiatric: She has a normal mood and affect   Her behavior is normal  Judgment and thought content normal             Diagnostic Results      Labs:    Results for orders placed or performed in visit on 09/24/18   Wound culture and Gram stain   Result Value Ref Range    Wound Culture (A)      4+ Growth of Methicillin Resistant Staphylococcus aureus    Wound Culture 1+ Growth of Pantoea agglomerans (A)     Gram Stain Result 1+ Polys     Gram Stain Result 3+ Gram positive cocci in pairs        Susceptibility    Pantoea agglomerans - JEANNETTE     ZID Performed Yes       Amoxicillin + Clavulanate <=4/2 Susceptible ug/ml     Ampicillin ($$) >16 00 Resistant ug/ml     Ampicillin + Sulbactam ($) 8/4 Susceptible ug/ml     Aztreonam ($$$)  <=4 Susceptible ug/ml     Cefazolin ($) <=2 00 Susceptible ug/ml     Ciprofloxacin ($)  <=1 00 Susceptible ug/ml     Ertapenem ($$$) <=0 5 Susceptible ug/ml     Gentamicin ($$) <=1 Susceptible ug/ml     Levofloxacin ($) <=0 25 Susceptible ug/ml     Tetracycline <=4 Susceptible ug/ml     Tobramycin ($) <=1 Susceptible ug/ml     Trimethoprim + Sulfamethoxazole ($$$) <=2/38 Susceptible ug/ml    Methicillin Resistant Staphylococcus aureus - JEANNETTE     Ampicillin ($$) >8 00 Resistant ug/ml     Cefazolin ($) <=4 00 Resistant ug/ml     Clindamycin ($) <=0 25 Susceptible ug/ml     Erythromycin ($$$$) >4 00 Resistant ug/ml     Gentamicin ($$) <=1 Susceptible ug/ml     Oxacillin >2 00 Resistant ug/ml     Tetracycline <=2 Susceptible ug/ml     Trimethoprim + Sulfamethoxazole ($$$) <=0 5/9 5 Susceptible ug/ml     Vancomycin ($) 1 00 Susceptible ug/ml       All labs reviewed and utilized in the medical decision making process    Radiology:    XR elbow 3+ vw LEFT   ED Interpretation   Resolution of posterior dislocation demonstrated on previous imaging, no acute fracture seen      Final Result      Successful reduction of previously dislocated left elbow joint            Workstation performed: [id number]         XR humerus LEFT   ED Interpretation   Left elbow posterior dislocation      Final Result      Dislocation of the left elbow joint            Workstation performed: [id number]         XR forearm 2 vw left   Final Result      Dislocation of the left elbow joint    No bony fractures are seen            Workstation performed: [id number]             All radiology studies independently viewed by me and interpreted by the radiologist     Procedure    Orthopedic Injury  Date/Time: 1/15/2019 5:15 PM  Performed by: Candyce Dakin by: Eva Trevino     Patient Location:  ED  Other Assisting Provider: Yes (comment) (Counter traction provided by Alec Zendejas NP)    Verbal consent obtained?: Yes    Risks and benefits: Risks, benefits and alternatives were discussed    Consent given by:  Patient  Patient states understanding of procedure being performed: Yes    Patient's understanding of procedure matches consent: Yes    Site marked: Yes    Radiology Images displayed and confirmed   If images not available, report reviewed: Yes    Patient identity confirmed:  Provided demographic data  Time out: Immediately prior to the procedure a time out was called    Injury location:  Elbow  Location details:  Left elbow  Injury type:  Dislocation  Dislocation type: posterior    Neurovascular status: Neurovascularly intact    Distal perfusion: normal    Neurological function: normal    Neurological function comment:  As noted above, patient complaining of paresthesias but sensation intact  Range of motion: normal    Local anesthesia used?: Yes    Anesthesia:  Hematoma block  Local anesthetic:  Lidocaine 2% without epinephrine  Anesthetic total (ml):  10  Manipulation performed?: Yes    Reduction method:  Traction and counter traction  Reduction method:  Traction and counter traction  Reduction method:  Traction and counter traction  Reduction method:  Traction and counter traction  Reduction method:  Traction and counter traction  Reduction method:  Traction and counter traction  Reduction successful?: Yes    Confirmation: Reduction confirmed by x-ray    Immobilization:  Splint and sling  Splint type:  Long arm  Supplies used:  Ortho-Glass, elastic bandage and cotton padding  Neurovascular status: Neurovascularly intact    Distal perfusion: normal    Neurological function: normal    Neurological function comment:  Hand strength the same as prior, patient still complaining of paresthesias with pins and needle sensation of light touch intact and unchanged from pre procedure  Range of motion: normal    Patient tolerance:  Patient tolerated the procedure well with no immediate complications        CritCare Time      ED Course of Care and Re-Assessments    Discussed with Orthopedics, who recommended ER reduction if possible and placement in long-arm splint for outpatient follow-up with Orthopedics  Patient offered hematoma block and parental analgesia versus procedural sedation versus splinting with close follow up with Orthopedics (with risk for substantial further delay in reduction)  After discussion of risks and benefits, patient opted for the former with conversion to procedural sedation if she is unable to tolerate the procedure  Dislocation reduced as above and immobilized in a long-arm splint  Splint recheck after application patient still neurovascularly intact at distal fingers  Medications   lidocaine (PF) (XYLOCAINE-MPF) 2 % injection 10 mL (10 mL Infiltration Given 1/15/19 1141)   HYDROmorphone (DILAUDID) injection 0 5 mg (0 5 mg Intravenous Given 1/15/19 1140)   ondansetron (ZOFRAN) injection 4 mg (4 mg Intravenous Given 1/15/19 1140)   HYDROmorphone (DILAUDID) injection 0 5 mg (0 5 mg Intravenous Given 1/15/19 1151)           FINAL IMPRESSION    Final diagnoses:   Dislocation of left elbow, initial encounter         DISPOSITION/PLAN    Left elbow dislocation as above  Vital signs reassuring, examination concerning for deformity and reduced range of motion  Patient consented and dislocation reduced as above  Splinted and discharged strict return precautions, follow up with Orthopedics  Hemodynamically stable and comfortable at that time    Time reflects when diagnosis was documented in both MDM as applicable and the Disposition within this note     Time User Action Codes Description Comment    1/15/2019 12:59 PM Arnaud Saad T Add [S53 115A] Anterior dislocation of left elbow, initial encounter     1/15/2019  1:00 PM Arnaud Saha T Remove [S90 047H] Anterior dislocation of left elbow, initial encounter     1/15/2019  1:01 PM Eliseo Patton Add [S53 105A] Dislocation of left elbow, initial encounter       ED Disposition     ED Disposition Condition Comment    Discharge  [address] discharge to home/self care  Condition at discharge: Good        Follow-up Information     Follow up With Specialties Details Why Contact Info    Joan Elizondo MD Orthopedic Surgery, Orthopedics Call today To schedule followup as soon as possible for your dislocated elbow [address] [place] [address] [address]  [facility] [address]  [phone]              PATIENT REFERRED TO:    Joan Elizondo MD  [address] [facility] 200  [facility] [address]    Call today  To schedule followup as soon as possible for your dislocated elbow      DISCHARGE MEDICATIONS:    Discharge Medication List as of 1/15/2019  1:04 PM      START taking these medications    Details   !! naproxen (NAPROSYN) 375 mg tablet Take 1 tablet (375 mg total) by mouth 2 (two) times a day with meals, Starting Tue 1/15/2019, Print      oxyCODONE-acetaminophen (PERCOCET) 5-325 mg per tablet Take 1 tablet by mouth every 4 (four) hours as needed for moderate pain for up to 11 doses Max Daily Amount: 6 tablets, Starting Tue 1/15/2019, Print       !! - Potential duplicate medications found  Please discuss with provider        CONTINUE these medications which have NOT CHANGED    Details   FLOVENT  MCG/ACT inhaler Inhale 1 puff 2 (two) times a day, Starting Thu 10/11/2018, Historical Med      mupirocin (BACTROBAN) 2 % ointment Apply topically 2 (two) times a day for 5 days, Starting Mon 9/24/2018, Until Sat 9/29/2018, Normal      !! naproxen (NAPROSYN) 500 mg tablet Take 1 tablet (500 mg total) by mouth 2 (two) times a day with meals, Starting Wed 10/10/2018, Normal      !! naproxen (NAPROSYN) 500 mg tablet Take 0 5 tablets (250 mg total) by mouth 2 (two) times a day with meals, Starting Mon 1/7/2019, Normal      topiramate (TOPAMAX) 200 MG tablet Take 1 tablet (200 mg total) by mouth 2 (two) times a day, Starting Mon 12/3/2018, Normal      VENTOLIN  (90 Base) MCG/ACT inhaler INHALE 2 PUFFS EVERY 6 HOURS AS NEEDED FOR WHEEZING, Historical Med       !! - Potential duplicate medications found  Please discuss with provider  No discharge procedures on file           MD Max Dubois MD  01/15/19 3786

## 2019-01-16 NOTE — ED PROVIDER NOTES
History  Chief Complaint   Patient presents with    Elbow Injury     Pt present to ED because patient states her L elbow popped out again  Pt was seen in ED this morning for elbow dislocation this morning  Splint still intact     HPI   78-year-old pleasant, well-appearing right-handed female seen in the emergency department earlier for elbow dislocation returns to the emergency department for possible recurrent dislocation  Patient states that she had just returned home when she felt pop in her left elbow  She denies purposefully moving the arm and has been wearing the splint and sling that she was discharged with  On presentation, she complains of decreased sensation in her left 3 fingers as well as pain in her left elbow  She denies any complaints other than stated above  On presentation, splint is removed and patient states that left elbow swelling is more significant than the previously was  Prior to Admission Medications   Prescriptions Last Dose Informant Patient Reported? Taking?    FLOVENT  MCG/ACT inhaler   Yes No   Sig: Inhale 1 puff 2 (two) times a day   VENTOLIN  (90 Base) MCG/ACT inhaler   Yes No   Sig: INHALE 2 PUFFS EVERY 6 HOURS AS NEEDED FOR WHEEZING   mupirocin (BACTROBAN) 2 % ointment   No No   Sig: Apply topically 2 (two) times a day for 5 days   naproxen (NAPROSYN) 375 mg tablet   No No   Sig: Take 1 tablet (375 mg total) by mouth 2 (two) times a day with meals   naproxen (NAPROSYN) 500 mg tablet   No No   Sig: Take 1 tablet (500 mg total) by mouth 2 (two) times a day with meals   naproxen (NAPROSYN) 500 mg tablet   No No   Sig: Take 0 5 tablets (250 mg total) by mouth 2 (two) times a day with meals   oxyCODONE-acetaminophen (PERCOCET) 5-325 mg per tablet   No No   Sig: Take 1 tablet by mouth every 4 (four) hours as needed for moderate pain for up to 11 doses Max Daily Amount: 6 tablets   topiramate (TOPAMAX) 200 MG tablet   No No   Sig: Take 1 tablet (200 mg total) by mouth 2 (two) times a day for 30 days   topiramate (TOPAMAX) 200 MG tablet   No No   Sig: Take 1 tablet (200 mg total) by mouth 2 (two) times a day      Facility-Administered Medications: None       Past Medical History:   Diagnosis Date    Allergic     Anxiety     Asthma     Migraine     Pneumonia        Past Surgical History:   Procedure Laterality Date     SECTION      FEMUR FRACTURE SURGERY         Family History   Problem Relation Age of Onset    Diabetes Mother     Hypertension Father     Asthma Son     Asthma Daughter      I have reviewed and agree with the history as documented  Social History   Substance Use Topics    Smoking status: Current Every Day Smoker     Packs/day: 0 25     Types: Cigarettes    Smokeless tobacco: Never Used    Alcohol use No        Review of Systems   Constitutional: Negative for chills and fever  Respiratory: Negative for shortness of breath  Gastrointestinal: Negative for abdominal pain, nausea and vomiting  Musculoskeletal: Positive for arthralgias and joint swelling  Skin: Negative for rash and wound  Allergic/Immunologic: Negative for immunocompromised state  Neurological: Positive for numbness  Negative for headaches  Psychiatric/Behavioral: The patient is not nervous/anxious  All other systems reviewed and are negative  Physical Exam  Physical Exam   Constitutional: She is oriented to person, place, and time  She appears well-nourished  No distress  HENT:   Head: Normocephalic and atraumatic  Eyes: EOM are normal    Neck: Normal range of motion  Neck supple  Cardiovascular: Normal rate and regular rhythm  Pulmonary/Chest: Effort normal and breath sounds normal  No respiratory distress  Abdominal: Soft  She exhibits no distension  There is no tenderness  Musculoskeletal: She exhibits edema  Left elbow: She exhibits decreased range of motion, swelling, effusion and deformity  She exhibits no laceration   Tenderness found    Significant swelling left elbow, diffuse tenderness, decreased ROM due to pain and swelling  Slight decrease in sensation along ulnar distribution  Motor intact distally  Normal cap refill     Neurological: She is alert and oriented to person, place, and time  Skin: Skin is warm and dry  She is not diaphoretic  Psychiatric: She has a normal mood and affect  Her behavior is normal    Nursing note and vitals reviewed  Vital Signs  ED Triage Vitals [01/15/19 1722]   Temperature Pulse Respirations Blood Pressure SpO2   98 4 °F (36 9 °C) 74 16 145/88 99 %      Temp Source Heart Rate Source Patient Position - Orthostatic VS BP Location FiO2 (%)   Oral Monitor Sitting Right arm --      Pain Score       3           Vitals:    01/15/19 1722   BP: 145/88   Pulse: 74   Patient Position - Orthostatic VS: Sitting       Visual Acuity      ED Medications  Medications   HYDROmorphone (DILAUDID) injection 1 mg (1 mg Intravenous Given 1/15/19 1850)   lidocaine (PF) (XYLOCAINE-MPF) 2 % injection 10 mL (10 mL Infiltration Given by Other 1/15/19 1924)   HYDROmorphone (DILAUDID) injection 0 5 mg (0 5 mg Intravenous Given 1/15/19 1937)   HYDROmorphone (DILAUDID) injection 0 5 mg (0 5 mg Intravenous Given 1/15/19 2029)       Diagnostic Studies  Results Reviewed     None                 XR elbow 2 vw left   Final Result by Rudi Litten, MD (01/16 4384)      Anatomic alignment has been achieved  Limited imaging due to cast artifact  Workstation performed: [id number]         XR elbow 2 views LEFT   ED Interpretation by Bong Damon MD (01/15 3146)   Abnormal   Dislocation      Final Result by Aki Stevens DO (01/16 3437)   Recurrent elbow dislocation        Workstation performed: [id number]                    Procedures  Orthopedic Injury  Date/Time: 1/15/2019 7:40 PM  Performed by: Ana Carnes by: Richie Severance     Patient Location:  ED  Verbal consent obtained?: Yes    Risks and benefits: Risks, benefits and alternatives were discussed    Consent given by:  Patient  Patient states understanding of procedure being performed: Yes    Patient's understanding of procedure matches consent: Yes    Radiology Images displayed and confirmed   If images not available, report reviewed: Yes    Required items: Required blood products, implants, devices and special equipment available    Patient identity confirmed:  Verbally with patient  Time out: Immediately prior to the procedure a time out was called    Injury location:  Elbow  Location details:  Left elbow  Injury type:  Dislocation  Dislocation type: posterior    Distal perfusion: normal    Neurological function: diminished    Neurological function comment:  Slightly decreased sensation along ulnar distribution  Range of motion: reduced    Local anesthesia used?: Yes    General anesthesia used?: No    Anesthesia:  Local infiltration  Local anesthetic:  Lidocaine 2% without epinephrine  Anesthetic total (ml):  10  Manipulation performed?: Yes    Reduction method:  Traction and counter traction  Reduction method:  Traction and counter traction  Reduction method:  Traction and counter traction  Reduction method:  Traction and counter traction  Reduction method:  Traction and counter traction  Reduction method:  Traction and counter traction  Reduction successful?: Yes    Confirmation: Reduction confirmed by x-ray    Immobilization:  Splint  Splint type:  Long arm (and sugar tong)  Supplies used:  Cotton padding, Ortho-Glass and elastic bandage  Distal perfusion: normal    Neurological function: diminished    Range of motion: unchanged    Patient tolerance:  Patient tolerated the procedure well with no immediate complications           Phone Contacts  ED Phone Contact    ED Course                               MDM  CritCare Time    Disposition  Final diagnoses:   Closed posterior dislocation of elbow, left, sequela     Time reflects when diagnosis was documented in both MDM as applicable and the Disposition within this note     Time User Action Codes Description Comment    1/15/2019  8:20 PM Naren Torres Add [S53 125S] Closed posterior dislocation of elbow, left, sequela       ED Disposition     ED Disposition Condition Comment    Discharge  [address] discharge to home/self care      Condition at discharge: Good        Follow-up Information     Follow up With Specialties Details Why Contact Info    Farnaz Neal MD Orthopedic Surgery, Orthopedics Schedule an appointment as soon as possible for a visit  [address]            Discharge Medication List as of 1/15/2019  8:22 PM      CONTINUE these medications which have NOT CHANGED    Details   FLOVENT  MCG/ACT inhaler Inhale 1 puff 2 (two) times a day, Starting Thu 10/11/2018, Historical Med      mupirocin (BACTROBAN) 2 % ointment Apply topically 2 (two) times a day for 5 days, Starting Mon 9/24/2018, Until Sat 9/29/2018, Normal      !! naproxen (NAPROSYN) 375 mg tablet Take 1 tablet (375 mg total) by mouth 2 (two) times a day with meals, Starting Tue 1/15/2019, Print      !! naproxen (NAPROSYN) 500 mg tablet Take 1 tablet (500 mg total) by mouth 2 (two) times a day with meals, Starting Wed 10/10/2018, Normal      !! naproxen (NAPROSYN) 500 mg tablet Take 0 5 tablets (250 mg total) by mouth 2 (two) times a day with meals, Starting Mon 1/7/2019, Normal      oxyCODONE-acetaminophen (PERCOCET) 5-325 mg per tablet Take 1 tablet by mouth every 4 (four) hours as needed for moderate pain for up to 11 doses Max Daily Amount: 6 tablets, Starting Tue 1/15/2019, Print      topiramate (TOPAMAX) 200 MG tablet Take 1 tablet (200 mg total) by mouth 2 (two) times a day, Starting Mon 12/3/2018, Normal      VENTOLIN  (90 Base) MCG/ACT inhaler INHALE 2 PUFFS EVERY 6 HOURS AS NEEDED FOR WHEEZING, Historical Med       !! - Potential duplicate medications found  Please discuss with provider  No discharge procedures on file      ED Provider  Electronically Signed by           Althea Dockery MD  01/16/19 8373

## 2019-01-16 NOTE — ED NOTES
Spoke with [facility] in [place] regarding disk for patient       Madeleine Lafleur RN  01/15/19 1921

## 2019-01-16 NOTE — DISCHARGE INSTRUCTIONS
Elbow Dislocation   WHAT YOU NEED TO KNOW:   An elbow dislocation happens when the bones in the elbow are pulled apart  This causes stretching or tearing of the ligaments that hold the bones together in the elbow joint  DISCHARGE INSTRUCTIONS:   Medicine: You may need any of the following:  · NSAIDs  help decrease swelling, pain, and fever  This medicine is available without a doctor's order  NSAIDs can cause stomach bleeding or kidney problems  If you take blood thinner medicine, always ask your healthcare provider if NSAIDs are safe for you  Always read the medicine label and follow directions  · Acetaminophen  decreases pain and fever  It is available without a doctor's order  Ask how much to take and how often to take it  Follow directions  Acetaminophen can cause liver damage if not taken correctly  · Take your medicine as directed  Contact your healthcare provider if you think your medicine is not helping or if you have side effects  Tell him or her if you are allergic to any medicine  Keep a list of the medicines, vitamins, and herbs you take  Include the amounts, and when and why you take them  Bring the list or the pill bottles to follow-up visits  Carry your medicine list with you in case of an emergency  Self-care:   · Immobilize your elbow  as directed  You may need to wear a splint on your arm to keep the bones from moving  This helps decrease pain and allows your elbow to heal  You may be told to wear the splint during the day and at night  Put a folded wash cloth under your armpit to help make your arm and hand more comfortable  Ask if you can remove the splint for bathing or showering  · Ice  your elbow for 15 to 20 minutes every hour or as directed  Use an ice pack, or put crushed ice in a plastic bag  Cover it with a towel  Ice helps prevent tissue damage and decreases swelling and pain  · Elevate  your elbow above the level of your heart as often as you can   This will help decrease swelling and pain  Prop your elbow on pillows or blankets to keep it elevated comfortably  · Physical therapy  may be recommended  A physical therapist teaches you exercises to help improve movement and strength, and to decrease pain  Contact your healthcare provider if:   · Your pain or swelling gets worse  · You have trouble moving your elbow after your injury  · The bones in your elbow pop in and out of place more than once  · You have questions or concerns about your condition or care  Return to the emergency department if:   · Your arm feels numb or cold and looks pale  © 2017 [facility] Information is for End User's use only and may not be sold, redistributed or otherwise used for commercial purposes  All illustrations and images included in CareNotes® are the copyrighted property of [employer] , [employer]  or Ronald Santana  The above information is an  only  It is not intended as medical advice for individual conditions or treatments  Talk to your doctor, nurse or pharmacist before following any medical regimen to see if it is safe and effective for you

## 2019-01-17 ENCOUNTER — OFFICE VISIT (OUTPATIENT)
Dept: OBGYN CLINIC | Facility: CLINIC | Age: 29
End: 2019-01-17
Payer: COMMERCIAL

## 2019-01-17 ENCOUNTER — APPOINTMENT (OUTPATIENT)
Dept: RADIOLOGY | Facility: CLINIC | Age: 29
End: 2019-01-17
Payer: COMMERCIAL

## 2019-01-17 VITALS
BODY MASS INDEX: 29.02 KG/M2 | SYSTOLIC BLOOD PRESSURE: 127 MMHG | WEIGHT: 180.6 LBS | HEIGHT: 66 IN | DIASTOLIC BLOOD PRESSURE: 82 MMHG | HEART RATE: 90 BPM

## 2019-01-17 DIAGNOSIS — M25.522 ELBOW PAIN, LEFT: ICD-10-CM

## 2019-01-17 DIAGNOSIS — S53.105A DISLOCATION OF LEFT ELBOW, INITIAL ENCOUNTER: Primary | ICD-10-CM

## 2019-01-17 PROCEDURE — 73080 X-RAY EXAM OF ELBOW: CPT

## 2019-01-17 PROCEDURE — 99203 OFFICE O/P NEW LOW 30 MIN: CPT | Performed by: ORTHOPAEDIC SURGERY

## 2019-01-17 NOTE — PROGRESS NOTES
CHIEF COMPLAINT:  Chief Complaint   Patient presents with    Left Elbow - Pain       SUBJECTIVE:  Ryan Mayer is a 25y o  year old RHD female who presents to the office for evaluation of her left elbow  Patient sustained injury when her dog pulled her down approximately 4 steps on 2019  Patient was seen in the emergency department on 01/15/2019  Patient was diagnosed with dislocation left elbow, reduction was performed in the hospital and confirmed by x-rays  Pt was placed in a long arm splint  Pt returned to the ED 3 hours later for redislocation  Patient was placed into a new long arm and sugar-tong splint and referred to Orthopedics  Pt states that she had some numbness  and fullness in her fingers while in the splint  Patient states she does feel less numbness since the splint has been removed  Pt has signifigant pain at the left elbow  Pt was prescribed oxycodone at the ED        PAST MEDICAL HISTORY:  Past Medical History:   Diagnosis Date    Allergic     Anxiety     Asthma     Migraine     Pneumonia        PAST SURGICAL HISTORY:  Past Surgical History:   Procedure Laterality Date     SECTION      FEMUR FRACTURE SURGERY         FAMILY HISTORY:  Family History   Problem Relation Age of Onset    Diabetes Mother     Hypertension Father     Asthma Son     Asthma Daughter        SOCIAL HISTORY:  Social History   Substance Use Topics    Smoking status: Current Every Day Smoker     Packs/day: 0 25     Types: Cigarettes    Smokeless tobacco: Never Used    Alcohol use No       MEDICATIONS:    Current Outpatient Prescriptions:     FLOVENT  MCG/ACT inhaler, Inhale 1 puff 2 (two) times a day, Disp: , Rfl: 4    naproxen (NAPROSYN) 375 mg tablet, Take 1 tablet (375 mg total) by mouth 2 (two) times a day with meals, Disp: 30 tablet, Rfl: 0    naproxen (NAPROSYN) 500 mg tablet, Take 0 5 tablets (250 mg total) by mouth 2 (two) times a day with meals, Disp: 60 tablet, Rfl: 1   oxyCODONE-acetaminophen (PERCOCET) 5-325 mg per tablet, Take 1 tablet by mouth every 4 (four) hours as needed for moderate pain for up to 11 doses Max Daily Amount: 6 tablets, Disp: 11 tablet, Rfl: 0    topiramate (TOPAMAX) 200 MG tablet, Take 1 tablet (200 mg total) by mouth 2 (two) times a day, Disp: 60 tablet, Rfl: 5    VENTOLIN  (90 Base) MCG/ACT inhaler, INHALE 2 PUFFS EVERY 6 HOURS AS NEEDED FOR WHEEZING, Disp: , Rfl: 3    mupirocin (BACTROBAN) 2 % ointment, Apply topically 2 (two) times a day for 5 days, Disp: 22 g, Rfl: 0    topiramate (TOPAMAX) 200 MG tablet, Take 1 tablet (200 mg total) by mouth 2 (two) times a day for 30 days, Disp: 60 tablet, Rfl: 5    ALLERGIES:  Allergies   Allergen Reactions    Diphenhydramine      Other reaction(s): DIPHENHYDRAMINE (BENADRYL)    Bee Venom Tongue Swelling    Ciprofloxacin     Doxycycline     Sulfasalazine        REVIEW OF SYSTEMS:  Review of Systems   Constitutional: Negative for chills, fever and unexpected weight change  HENT: Negative for hearing loss, nosebleeds and sore throat  Eyes: Negative for pain, redness and visual disturbance  Respiratory: Negative for cough, shortness of breath and wheezing  Cardiovascular: Negative for chest pain, palpitations and leg swelling  Gastrointestinal: Negative for abdominal pain, nausea and vomiting  Endocrine: Negative for polydipsia and polyuria  Genitourinary: Negative for dysuria and hematuria  Musculoskeletal: Positive for arthralgias  Negative for joint swelling and myalgias  Skin: Negative for rash and wound  Neurological: Negative for dizziness, numbness and headaches  Psychiatric/Behavioral: Negative for decreased concentration, dysphoric mood and suicidal ideas  The patient is not nervous/anxious          VITALS:  Vitals:    01/17/19 1049   BP: 127/82   Pulse: 90       LABS:  HgA1c: No results found for: HGBA1C  BMP:   Lab Results   Component Value Date    CALCIUM 9 1 06/05/2018 K 4 0 06/05/2018    CO2 23 06/05/2018     (H) 06/05/2018    BUN 15 06/05/2018    CREATININE 0 71 06/05/2018       _____________________________________________________  PHYSICAL EXAMINATION:  General: well developed and well nourished, alert, oriented times 3 and appears comfortable  Psychiatric: Normal  HEENT: Trachea Midline, No torticollis  Pulmonary: No audible wheezing or respiratory distress   Skin: No masses, erthema, lacerations, fluctation, ulcerations  Neurovascular: Sensation Intact to the Median, Ulnar, Radial Nerve, Motor Intact to the Median, Ulnar, Radial Nerve and Pulses Intact    MUSCULOSKELETAL EXAMINATION:  Left elbow  Minimal fullness at the elbow       ___________________________________________________  STUDIES REVIEWED:  Images obtained today of the left elbow 3 views demonstrate maintained reduction of the elbow      PROCEDURES PERFORMED:  Procedures  No Procedures performed today    _____________________________________________________  ASSESSMENT/PLAN:    Left elbow dislocation reduced in ED 2x  * Elbow well reduced on todays visit  * Pt was placed in a a tscope brace with a max of 90 degrees extension and and full flexion  * pt may wear sling  * PT/OT order was placed to add 15 degrees extension per week        Follow Up:  Return in about 2 weeks (around 1/31/2019)        To Do Next Visit:  Re-evaluation of current issue        Scribe Attestation    I,:   Blake Almeida am acting as a scribe while in the presence of the attending physician :        I,:   Jacky Mcdonough MD personally performed the services described in this documentation    as scribed in my presence :

## 2019-01-22 ENCOUNTER — TELEPHONE (OUTPATIENT)
Dept: OBGYN CLINIC | Facility: HOSPITAL | Age: 29
End: 2019-01-22

## 2019-01-22 ENCOUNTER — HOSPITAL ENCOUNTER (EMERGENCY)
Facility: HOSPITAL | Age: 29
Discharge: HOME | End: 2019-01-22
Attending: EMERGENCY MEDICINE
Payer: COMMERCIAL

## 2019-01-22 ENCOUNTER — APPOINTMENT (EMERGENCY)
Dept: RADIOLOGY | Facility: HOSPITAL | Age: 29
End: 2019-01-22
Payer: COMMERCIAL

## 2019-01-22 VITALS
OXYGEN SATURATION: 100 % | HEART RATE: 91 BPM | SYSTOLIC BLOOD PRESSURE: 160 MMHG | RESPIRATION RATE: 18 BRPM | DIASTOLIC BLOOD PRESSURE: 95 MMHG | TEMPERATURE: 98.8 F

## 2019-01-22 DIAGNOSIS — S42.402S: ICD-10-CM

## 2019-01-22 DIAGNOSIS — M25.442 SWELLING OF HAND JOINT, LEFT: Primary | ICD-10-CM

## 2019-01-22 PROCEDURE — 99283 EMERGENCY DEPT VISIT LOW MDM: CPT

## 2019-01-22 PROCEDURE — 73130 X-RAY EXAM OF HAND: CPT

## 2019-01-22 NOTE — TELEPHONE ENCOUNTER
I called the patient back and reviewed the note from last week in the office  She denies any new trauma  She is taking NSAIDS  I advised to try to keep the arm elevated and to make sure the straps on the brace are not too tight  It is normal with her condition to have hand swelling and bruising  I offered the patient an appt but she declined at this time  Dr Jonel Chavez is out all this week, but I am in the office in [facility] taking care of any of his patient issues  I am his PA  Shannon Guzman, can you please upload the pictures that the patient sent you in her chart or email them to me? Thank you

## 2019-01-22 NOTE — TELEPHONE ENCOUNTER
Please be aware pt  Called stating she noticed after her visit with ortho yesterday her left hand and fingers started with bruising  Pt  Is also concerned about swelling, which she said has not gone down at all and her left arm is double the size of her right arm  I advised pt  On checking capillary refill, pt  Was able to tell me the correct way of checking, at this time pt  Capillary refill of all fingers on left hand is < 2 sec  Also advised pt  To adjust the brace she is wearing to accommodate for her swelling  I advised some bruising is not uncommon for that extremity in regards to the type of injury  Pt  Will send pics to me via e-mail  Please advise

## 2019-01-22 NOTE — TELEPHONE ENCOUNTER
I haven't received any pics yet  As soon as I get them I would forward them vis e-mail to you       You're welcome

## 2019-01-22 NOTE — TELEPHONE ENCOUNTER
I reviewed the 4 images in the email  There is some swelling and ecchymosis in the hand  No further recommendations  She should continue with the recommendations as per my phone call with her earlier  I told her that if there were any changes to my initial recommendations, that I would call her back  Therefore, no phone call is needed  She will call us if she wants to schedule an appointment with me this week  I am in the office taking care of any Dr Vianey Blair patient's issues in [place] for the rest of this week  I do NOT have an open schedule, but if a patient needs to be seen, I will accomodate

## 2019-01-23 NOTE — ED PROVIDER NOTES
History  Chief Complaint   Patient presents with    Hand Swelling     Pt reports having dislocated elbow x 2 days ago after a falling on left side  Patient reports increased swelling of left hand since  HPI   28-year-old pleasant, well-appearing female presents 1 week status post left elbow dislocation with chief complaint of left hand swelling  Patient was seen by Ortho 2 days after the dislocation and placed in a T scope brace, which she has been wearing, as well a sling  She is recently noted increased swelling in her left hand does not present a week ago  She denies any associated pain in her hand  She has not noted any modifying factors for the swelling denies having had similar swelling in the past   She denies any injury to the hand or wrist   She complains persistent paresthesias/decreased sensation in fourth and fingers, but states that the symptoms have been present since the dislocation  Review of systems negative other than stated above  On presentation, patient is wearing T scope brace which provide support only use distal as her wrist   Hand is not supported  Hand swelling    Prior to Admission Medications   Prescriptions Last Dose Informant Patient Reported? Taking?    FLOVENT  MCG/ACT inhaler  Self Yes No   Sig: Inhale 1 puff 2 (two) times a day   VENTOLIN  (90 Base) MCG/ACT inhaler  Self Yes No   Sig: INHALE 2 PUFFS EVERY 6 HOURS AS NEEDED FOR WHEEZING   mupirocin (BACTROBAN) 2 % ointment   No No   Sig: Apply topically 2 (two) times a day for 5 days   naproxen (NAPROSYN) 375 mg tablet  Self No No   Sig: Take 1 tablet (375 mg total) by mouth 2 (two) times a day with meals   naproxen (NAPROSYN) 500 mg tablet  Self No No   Sig: Take 0 5 tablets (250 mg total) by mouth 2 (two) times a day with meals   oxyCODONE-acetaminophen (PERCOCET) 5-325 mg per tablet  Self No No   Sig: Take 1 tablet by mouth every 4 (four) hours as needed for moderate pain for up to 11 doses Max Daily Amount: 6 tablets   topiramate (TOPAMAX) 200 MG tablet   No No   Sig: Take 1 tablet (200 mg total) by mouth 2 (two) times a day for 30 days   topiramate (TOPAMAX) 200 MG tablet  Self No No   Sig: Take 1 tablet (200 mg total) by mouth 2 (two) times a day      Facility-Administered Medications: None       Past Medical History:   Diagnosis Date    Allergic     Anxiety     Asthma     Migraine     Pneumonia        Past Surgical History:   Procedure Laterality Date     SECTION      FEMUR FRACTURE SURGERY         Family History   Problem Relation Age of Onset    Diabetes Mother     Hypertension Father     Asthma Son     Asthma Daughter      I have reviewed and agree with the history as documented  Social History   Substance Use Topics    Smoking status: Current Every Day Smoker     Packs/day: 0 25     Types: Cigarettes    Smokeless tobacco: Never Used    Alcohol use No        Review of Systems   Constitutional: Negative for chills and fever  Respiratory: Negative for shortness of breath  Gastrointestinal: Negative for abdominal pain, nausea and vomiting  Musculoskeletal: Positive for arthralgias and joint swelling  Skin: Negative for rash and wound  Allergic/Immunologic: Negative for immunocompromised state  Neurological: Negative for headaches  Psychiatric/Behavioral: The patient is not nervous/anxious  All other systems reviewed and are negative  Physical Exam  Physical Exam   Constitutional: She is oriented to person, place, and time  She appears well-nourished  No distress  HENT:   Head: Normocephalic and atraumatic  Eyes: EOM are normal    Neck: Normal range of motion  Neck supple  Cardiovascular: Normal rate, regular rhythm and intact distal pulses  Pulses:       Radial pulses are 2+ on the left side  Pulmonary/Chest: Effort normal and breath sounds normal  No respiratory distress  Abdominal: Soft  She exhibits no distension  There is no tenderness  Musculoskeletal: She exhibits edema  Left upper extremity swelling (distal>proximal)   Neurological: She is alert and oriented to person, place, and time  Skin: Skin is warm and dry  She is not diaphoretic  Psychiatric: She has a normal mood and affect  Her behavior is normal    Nursing note and vitals reviewed  Vital Signs  ED Triage Vitals [01/22/19 2122]   Temperature Pulse Respirations Blood Pressure SpO2   98 8 °F (37 1 °C) 91 18 160/95 100 %      Temp src Heart Rate Source Patient Position - Orthostatic VS BP Location FiO2 (%)   -- -- Sitting Right arm --      Pain Score       --           Vitals:    01/22/19 2122   BP: 160/95   Pulse: 91   Patient Position - Orthostatic VS: Sitting       Visual Acuity      ED Medications  Medications - No data to display    Diagnostic Studies  Results Reviewed     None                 XR hand 3+ views LEFT   Final Result by Chico Ritchie MD (01/23 0919)      Soft tissue swelling  No acute osseous abnormality            Workstation performed: [id number]                    Procedures  Procedures       Phone Contacts  ED Phone Contact    ED Course                               MDM  Number of Diagnoses or Management Options  Closed fracture dislocation of left elbow joint, sequela:   Swelling of hand joint, left:   Diagnosis management comments: XR hand ordered by first nurse prior to my evaluation  Left hand swelling likely secondary to pulling the fluid by gravity from elbow dislocation  Patient provided reassurance, will follow up with Ortho or return to the emergency department for any new or worsening symptoms      CritCare Time    Disposition  Final diagnoses:   Swelling of hand joint, left   Closed fracture dislocation of left elbow joint, sequela     Time reflects when diagnosis was documented in both MDM as applicable and the Disposition within this note     Time User Action Codes Description Comment    1/22/2019 10:16 PM Ely Wheeler Swelling of hand joint, left     1/22/2019 10:17 PM Dania Kumari Add [S42 [id number]] Closed fracture dislocation of left elbow joint, sequela       ED Disposition     ED Disposition Condition Date/Time Comment    Discharge  Tue Jan 22, 2019 10:17 PM [address] discharge to home/self care  Condition at discharge: Good        Follow-up Information     Follow up With Specialties Details Why Contact Info    Manan Dhillon MD Orthopedic Surgery, Orthopedics Schedule an appointment as soon as possible for a visit As needed, If symptoms worsen [address] [place] [address]            Discharge Medication List as of 1/22/2019 10:17 PM      CONTINUE these medications which have NOT CHANGED    Details   FLOVENT  MCG/ACT inhaler Inhale 1 puff 2 (two) times a day, Starting u 10/11/2018, Historical Med      mupirocin (BACTROBAN) 2 % ointment Apply topically 2 (two) times a day for 5 days, Starting Mon 9/24/2018, Until Sat 9/29/2018, Normal      !! naproxen (NAPROSYN) 375 mg tablet Take 1 tablet (375 mg total) by mouth 2 (two) times a day with meals, Starting Tue 1/15/2019, Print      !! naproxen (NAPROSYN) 500 mg tablet Take 0 5 tablets (250 mg total) by mouth 2 (two) times a day with meals, Starting Mon 1/7/2019, Normal      oxyCODONE-acetaminophen (PERCOCET) 5-325 mg per tablet Take 1 tablet by mouth every 4 (four) hours as needed for moderate pain for up to 11 doses Max Daily Amount: 6 tablets, Starting Tue 1/15/2019, Print      topiramate (TOPAMAX) 200 MG tablet Take 1 tablet (200 mg total) by mouth 2 (two) times a day, Starting Mon 12/3/2018, Normal      VENTOLIN  (90 Base) MCG/ACT inhaler INHALE 2 PUFFS EVERY 6 HOURS AS NEEDED FOR WHEEZING, Historical Med       !! - Potential duplicate medications found  Please discuss with provider  No discharge procedures on file      ED Provider  Electronically Signed by           Katia Allen MD  01/30/19 3931

## 2019-01-23 NOTE — DISCHARGE INSTRUCTIONS
Elbow Dislocation   WHAT YOU NEED TO KNOW:   An elbow dislocation happens when the bones in the elbow are pulled apart  This causes stretching or tearing of the ligaments that hold the bones together in the elbow joint  DISCHARGE INSTRUCTIONS:   Medicine: You may need any of the following:  · NSAIDs  help decrease swelling, pain, and fever  This medicine is available without a doctor's order  NSAIDs can cause stomach bleeding or kidney problems  If you take blood thinner medicine, always ask your healthcare provider if NSAIDs are safe for you  Always read the medicine label and follow directions  · Acetaminophen  decreases pain and fever  It is available without a doctor's order  Ask how much to take and how often to take it  Follow directions  Acetaminophen can cause liver damage if not taken correctly  · Take your medicine as directed  Contact your healthcare provider if you think your medicine is not helping or if you have side effects  Tell him or her if you are allergic to any medicine  Keep a list of the medicines, vitamins, and herbs you take  Include the amounts, and when and why you take them  Bring the list or the pill bottles to follow-up visits  Carry your medicine list with you in case of an emergency  Self-care:   · Immobilize your elbow  as directed  You may need to wear a splint on your arm to keep the bones from moving  This helps decrease pain and allows your elbow to heal  You may be told to wear the splint during the day and at night  Put a folded wash cloth under your armpit to help make your arm and hand more comfortable  Ask if you can remove the splint for bathing or showering  · Ice  your elbow for 15 to 20 minutes every hour or as directed  Use an ice pack, or put crushed ice in a plastic bag  Cover it with a towel  Ice helps prevent tissue damage and decreases swelling and pain  · Elevate  your elbow above the level of your heart as often as you can   This will help decrease swelling and pain  Prop your elbow on pillows or blankets to keep it elevated comfortably  · Physical therapy  may be recommended  A physical therapist teaches you exercises to help improve movement and strength, and to decrease pain  Contact your healthcare provider if:   · Your pain or swelling gets worse  · You have trouble moving your elbow after your injury  · The bones in your elbow pop in and out of place more than once  · You have questions or concerns about your condition or care  Return to the emergency department if:   · Your arm feels numb or cold and looks pale  © 2017 [facility] Information is for End User's use only and may not be sold, redistributed or otherwise used for commercial purposes  All illustrations and images included in CareNotes® are the copyrighted property of [employer]  or Ronald Santana  The above information is an  only  It is not intended as medical advice for individual conditions or treatments  Talk to your doctor, nurse or pharmacist before following any medical regimen to see if it is safe and effective for you

## 2019-02-14 ENCOUNTER — OFFICE VISIT (OUTPATIENT)
Dept: FAMILY MEDICINE CLINIC | Facility: CLINIC | Age: 29
End: 2019-02-14
Payer: COMMERCIAL

## 2019-02-14 VITALS
BODY MASS INDEX: 29.89 KG/M2 | WEIGHT: 186 LBS | HEIGHT: 66 IN | RESPIRATION RATE: 18 BRPM | TEMPERATURE: 97.9 F | DIASTOLIC BLOOD PRESSURE: 90 MMHG | HEART RATE: 102 BPM | OXYGEN SATURATION: 97 % | SYSTOLIC BLOOD PRESSURE: 168 MMHG

## 2019-02-14 DIAGNOSIS — J06.9 URI WITH COUGH AND CONGESTION: ICD-10-CM

## 2019-02-14 DIAGNOSIS — M25.522 LEFT ELBOW PAIN: Primary | ICD-10-CM

## 2019-02-14 PROCEDURE — 99214 OFFICE O/P EST MOD 30 MIN: CPT | Performed by: NURSE PRACTITIONER

## 2019-02-14 PROCEDURE — 3008F BODY MASS INDEX DOCD: CPT | Performed by: NURSE PRACTITIONER

## 2019-02-14 RX ORDER — NAPROXEN 500 MG/1
250 TABLET ORAL 2 TIMES DAILY WITH MEALS
Qty: 60 TABLET | Refills: 1 | Status: CANCELLED | OUTPATIENT
Start: 2019-02-14

## 2019-02-14 RX ORDER — AZITHROMYCIN 250 MG/1
500 TABLET, FILM COATED ORAL EVERY 24 HOURS
Qty: 10 TABLET | Refills: 0 | Status: SHIPPED | OUTPATIENT
Start: 2019-02-14 | End: 2019-02-19

## 2019-02-14 RX ORDER — IBUPROFEN 800 MG/1
800 TABLET ORAL EVERY 6 HOURS PRN
Qty: 90 TABLET | Refills: 0 | Status: SHIPPED | OUTPATIENT
Start: 2019-02-14 | End: 2020-06-16

## 2019-02-14 NOTE — PROGRESS NOTES
Assessment/Plan:    No problem-specific Assessment & Plan notes found for this encounter  Diagnoses and all orders for this visit:    Left elbow pain  -     ibuprofen (MOTRIN) 800 mg tablet; Take 1 tablet (800 mg total) by mouth every 6 (six) hours as needed for moderate pain for up to 30 days  -     Ambulatory referral to Orthopedic Surgery; Future    URI with cough and congestion  -     azithromycin (ZITHROMAX) 250 mg tablet; Take 2 tablets (500 mg total) by mouth every 24 hours for 5 days    Other orders  -     Cancel: naproxen (NAPROSYN) 500 mg tablet; Take 0 5 tablets (250 mg total) by mouth 2 (two) times a day with meals          Subjective:      Patient ID: Adair Engel is a 25 y o  female  Patient is here with 3 days duration of sinus congestion and cough  She has been taking OTC medication  She feels facial pressure and sinus congestion  On 1-22-19, she was seen in ER and had dx of dislocated elbow  Her BP was elevated, and she was in pain  Today, her blood pressure is high and she states she is still in pain  She would like a referral to new Ortho doc      The following portions of the patient's history were reviewed and updated as appropriate:   She  has a past medical history of Allergic, Anxiety, Asthma, Migraine, and Pneumonia    She   Patient Active Problem List    Diagnosis Date Noted    Left elbow pain 02/14/2019    Oral thrush 11/13/2018    Recurrent sinusitis 11/13/2018    Need for influenza vaccination 10/31/2018    Need for Tdap vaccination 10/31/2018    Cellulitis and abscess of buttock 09/24/2018    URI with cough and congestion 09/24/2018    Encounter to establish care 06/04/2018    Mild intermittent asthma without complication 57/60/0131    Scabies exposure 06/04/2018    Migraine with aura and without status migrainosus, not intractable 06/04/2018    Moderate persistent asthma without complication 88/24/2555    Rheumatoid arthritis involving both knees ([facility] [address] ) 2018    Arthralgia of both knees 2018    Need for lipid screening 2018    Non-seasonal allergic rhinitis due to pollen 2018    Cigarette nicotine dependence without complication      She  has a past surgical history that includes  section and Femur fracture surgery  Her family history includes Asthma in her daughter and son; Diabetes in her mother; Hypertension in her father  She  reports that she has been smoking cigarettes  She has been smoking about 0 25 packs per day  She has never used smokeless tobacco  She reports that she does not drink alcohol or use drugs  Current Outpatient Medications   Medication Sig Dispense Refill    azithromycin (ZITHROMAX) 250 mg tablet Take 2 tablets (500 mg total) by mouth every 24 hours for 5 days 10 tablet 0    FLOVENT  MCG/ACT inhaler Inhale 1 puff 2 (two) times a day  4    ibuprofen (MOTRIN) 800 mg tablet Take 1 tablet (800 mg total) by mouth every 6 (six) hours as needed for moderate pain for up to 30 days 90 tablet 0    mupirocin (BACTROBAN) 2 % ointment Apply topically 2 (two) times a day for 5 days 22 g 0    oxyCODONE-acetaminophen (PERCOCET) 5-325 mg per tablet Take 1 tablet by mouth every 4 (four) hours as needed for moderate pain for up to 11 doses Max Daily Amount: 6 tablets 11 tablet 0    topiramate (TOPAMAX) 200 MG tablet Take 1 tablet (200 mg total) by mouth 2 (two) times a day for 30 days 60 tablet 5    topiramate (TOPAMAX) 200 MG tablet Take 1 tablet (200 mg total) by mouth 2 (two) times a day 60 tablet 5    VENTOLIN  (90 Base) MCG/ACT inhaler INHALE 2 PUFFS EVERY 6 HOURS AS NEEDED FOR WHEEZING  3     No current facility-administered medications for this visit        Current Outpatient Medications on File Prior to Visit   Medication Sig    FLOVENT  MCG/ACT inhaler Inhale 1 puff 2 (two) times a day    mupirocin (BACTROBAN) 2 % ointment Apply topically 2 (two) times a day for 5 days    oxyCODONE-acetaminophen (PERCOCET) 5-325 mg per tablet Take 1 tablet by mouth every 4 (four) hours as needed for moderate pain for up to 11 doses Max Daily Amount: 6 tablets    topiramate (TOPAMAX) 200 MG tablet Take 1 tablet (200 mg total) by mouth 2 (two) times a day for 30 days    topiramate (TOPAMAX) 200 MG tablet Take 1 tablet (200 mg total) by mouth 2 (two) times a day    VENTOLIN  (90 Base) MCG/ACT inhaler INHALE 2 PUFFS EVERY 6 HOURS AS NEEDED FOR WHEEZING    [DISCONTINUED] naproxen (NAPROSYN) 375 mg tablet Take 1 tablet (375 mg total) by mouth 2 (two) times a day with meals    [DISCONTINUED] naproxen (NAPROSYN) 500 mg tablet Take 0 5 tablets (250 mg total) by mouth 2 (two) times a day with meals (Patient taking differently: Take 500 mg by mouth 2 (two) times a day with meals )     No current facility-administered medications on file prior to visit  She is allergic to diphenhydramine; bee venom; ciprofloxacin; doxycycline; and sulfasalazine       Review of Systems   Constitutional: Negative for fatigue and fever  HENT: Positive for congestion, sinus pressure and sinus pain  Negative for ear pain, postnasal drip and sore throat  Respiratory: Negative for cough, chest tightness and shortness of breath  Cardiovascular: Negative for chest pain and palpitations  Gastrointestinal: Negative for abdominal pain  Musculoskeletal:        Sharp , constant throbbing pain left elbow   Skin: Negative for color change, pallor and rash  Allergic/Immunologic: Negative for immunocompromised state  Neurological: Negative for headaches  Hematological: Negative for adenopathy  All other systems reviewed and are negative          Objective:      /90 (BP Location: Right arm, Patient Position: Sitting)   Pulse 102   Temp 97 9 °F (36 6 °C)   Resp 18   Ht 5' 6" (1 676 m)   Wt 84 4 kg (186 lb)   SpO2 97%   BMI 30 02 kg/m²          Physical Exam   Constitutional: She is oriented to person, place, and time  She appears well-developed and well-nourished  She appears distressed  HENT:   Head: Normocephalic and atraumatic  Right Ear: External ear normal    Left Ear: External ear normal    Nose: Nose normal    Mouth/Throat: Oropharynx is clear and moist  No oropharyngeal exudate  Nasal sinus congestion   Eyes: Pupils are equal, round, and reactive to light  Conjunctivae and EOM are normal    Neck: Normal range of motion  Neck supple  Cardiovascular: Normal rate, regular rhythm and normal heart sounds  Exam reveals no gallop and no friction rub  No murmur heard  Pulmonary/Chest: Effort normal and breath sounds normal  No respiratory distress  She has no wheezes  She has no rales  Abdominal: Soft  Musculoskeletal: Normal range of motion  She exhibits tenderness  She exhibits no edema or deformity  Left elbow tenderness and pain   Limited ROM of left upper extremity   Lymphadenopathy:     She has no cervical adenopathy  Neurological: She is alert and oriented to person, place, and time  Skin: Skin is warm and dry  No rash noted  She is not diaphoretic  Psychiatric: She has a normal mood and affect  Her behavior is normal  Judgment and thought content normal    Nursing note and vitals reviewed

## 2019-02-14 NOTE — PATIENT INSTRUCTIONS
Left elbow dislocation- refer to [facility] orthopedics   mg as needed for pain  URI with cough- start zithromax  Take as directed  , Drink plenty of fluids  Avoid dairy and sugar because this can increase congestion       [facility]

## 2019-04-17 DIAGNOSIS — G43.909 MIGRAINE WITHOUT STATUS MIGRAINOSUS, NOT INTRACTABLE, UNSPECIFIED MIGRAINE TYPE: ICD-10-CM

## 2019-04-17 DIAGNOSIS — G89.29 OTHER CHRONIC PAIN: ICD-10-CM

## 2019-04-17 RX ORDER — NAPROXEN 500 MG/1
250 TABLET ORAL 2 TIMES DAILY WITH MEALS
Qty: 60 TABLET | Refills: 1 | Status: SHIPPED | OUTPATIENT
Start: 2019-04-17 | End: 2019-07-26 | Stop reason: SDUPTHER

## 2019-06-11 DIAGNOSIS — J45.40 MODERATE PERSISTENT ASTHMA WITHOUT COMPLICATION: Primary | ICD-10-CM

## 2019-06-11 RX ORDER — FLUTICASONE PROPIONATE 220 UG/1
1 AEROSOL, METERED RESPIRATORY (INHALATION) 2 TIMES DAILY
Qty: 12 G | Refills: 3 | Status: SHIPPED | OUTPATIENT
Start: 2019-06-11 | End: 2019-12-03 | Stop reason: SDUPTHER

## 2019-06-20 ENCOUNTER — APPOINTMENT (EMERGENCY)
Dept: CT IMAGING | Facility: HOSPITAL | Age: 29
End: 2019-06-20
Payer: COMMERCIAL

## 2019-06-20 ENCOUNTER — HOSPITAL ENCOUNTER (EMERGENCY)
Facility: HOSPITAL | Age: 29
Discharge: HOME | End: 2019-06-21
Attending: EMERGENCY MEDICINE | Admitting: EMERGENCY MEDICINE
Payer: COMMERCIAL

## 2019-06-20 DIAGNOSIS — I26.99 PULMONARY EMBOLISM (HCC): Primary | ICD-10-CM

## 2019-06-20 LAB
ALBUMIN SERPL BCP-MCNC: 4 G/DL (ref 3.5–5)
ALP SERPL-CCNC: 82 U/L (ref 46–116)
ALT SERPL W P-5'-P-CCNC: 28 U/L (ref 12–78)
ANION GAP SERPL CALCULATED.3IONS-SCNC: 13 MMOL/L (ref 4–13)
AST SERPL W P-5'-P-CCNC: 22 U/L (ref 5–45)
BASOPHILS # BLD AUTO: 0.03 THOUSANDS/ΜL (ref 0–0.1)
BASOPHILS NFR BLD AUTO: 0 % (ref 0–1)
BILIRUB SERPL-MCNC: 0.5 MG/DL (ref 0.2–1)
BUN SERPL-MCNC: 17 MG/DL (ref 5–25)
CALCIUM SERPL-MCNC: 8.8 MG/DL (ref 8.3–10.1)
CHLORIDE SERPL-SCNC: 107 MMOL/L (ref 100–108)
CO2 SERPL-SCNC: 23 MMOL/L (ref 21–32)
CREAT SERPL-MCNC: 0.88 MG/DL (ref 0.6–1.3)
EOSINOPHIL # BLD AUTO: 0.03 THOUSAND/ΜL (ref 0–0.61)
EOSINOPHIL NFR BLD AUTO: 0 % (ref 0–6)
ERYTHROCYTE [DISTWIDTH] IN BLOOD BY AUTOMATED COUNT: 12.5 % (ref 11.6–15.1)
GFR SERPL CREATININE-BSD FRML MDRD: 88 ML/MIN/1.73SQ M
GLUCOSE SERPL-MCNC: 100 MG/DL (ref 65–140)
HCG SERPL QL: NEGATIVE
HCT VFR BLD AUTO: 42.8 % (ref 34.8–46.1)
HGB BLD-MCNC: 14.3 G/DL (ref 11.5–15.4)
IMM GRANULOCYTES # BLD AUTO: 0.04 THOUSAND/UL (ref 0–0.2)
IMM GRANULOCYTES NFR BLD AUTO: 0 % (ref 0–2)
LIPASE SERPL-CCNC: 85 U/L (ref 73–393)
LYMPHOCYTES # BLD AUTO: 1.78 THOUSANDS/ΜL (ref 0.6–4.47)
LYMPHOCYTES NFR BLD AUTO: 18 % (ref 14–44)
MCH RBC QN AUTO: 33.6 PG (ref 26.8–34.3)
MCHC RBC AUTO-ENTMCNC: 33.4 G/DL (ref 31.4–37.4)
MCV RBC AUTO: 101 FL (ref 82–98)
MONOCYTES # BLD AUTO: 0.85 THOUSAND/ΜL (ref 0.17–1.22)
MONOCYTES NFR BLD AUTO: 9 % (ref 4–12)
NEUTROPHILS # BLD AUTO: 7.22 THOUSANDS/ΜL (ref 1.85–7.62)
NEUTS SEG NFR BLD AUTO: 73 % (ref 43–75)
NRBC BLD AUTO-RTO: 0 /100 WBCS
PLATELET # BLD AUTO: 248 THOUSANDS/UL (ref 149–390)
PMV BLD AUTO: 9.2 FL (ref 8.9–12.7)
POTASSIUM SERPL-SCNC: 3.6 MMOL/L (ref 3.5–5.3)
PROT SERPL-MCNC: 8.1 G/DL (ref 6.4–8.2)
RBC # BLD AUTO: 4.26 MILLION/UL (ref 3.81–5.12)
SODIUM SERPL-SCNC: 143 MMOL/L (ref 136–145)
WBC # BLD AUTO: 9.95 THOUSAND/UL (ref 4.31–10.16)

## 2019-06-20 PROCEDURE — 96376 TX/PRO/DX INJ SAME DRUG ADON: CPT

## 2019-06-20 PROCEDURE — 85025 COMPLETE CBC W/AUTO DIFF WBC: CPT | Performed by: EMERGENCY MEDICINE

## 2019-06-20 PROCEDURE — 74177 CT ABD & PELVIS W/CONTRAST: CPT

## 2019-06-20 PROCEDURE — 83880 ASSAY OF NATRIURETIC PEPTIDE: CPT | Performed by: EMERGENCY MEDICINE

## 2019-06-20 PROCEDURE — 80053 COMPREHEN METABOLIC PANEL: CPT | Performed by: EMERGENCY MEDICINE

## 2019-06-20 PROCEDURE — 83690 ASSAY OF LIPASE: CPT | Performed by: EMERGENCY MEDICINE

## 2019-06-20 PROCEDURE — 99285 EMERGENCY DEPT VISIT HI MDM: CPT | Performed by: EMERGENCY MEDICINE

## 2019-06-20 PROCEDURE — 84703 CHORIONIC GONADOTROPIN ASSAY: CPT | Performed by: EMERGENCY MEDICINE

## 2019-06-20 PROCEDURE — 99284 EMERGENCY DEPT VISIT MOD MDM: CPT

## 2019-06-20 PROCEDURE — 71275 CT ANGIOGRAPHY CHEST: CPT

## 2019-06-20 PROCEDURE — 96361 HYDRATE IV INFUSION ADD-ON: CPT

## 2019-06-20 PROCEDURE — 96374 THER/PROPH/DIAG INJ IV PUSH: CPT

## 2019-06-20 PROCEDURE — 96375 TX/PRO/DX INJ NEW DRUG ADDON: CPT

## 2019-06-20 PROCEDURE — 36415 COLL VENOUS BLD VENIPUNCTURE: CPT | Performed by: EMERGENCY MEDICINE

## 2019-06-20 RX ORDER — SODIUM CHLORIDE 9 MG/ML
1000 INJECTION, SOLUTION INTRAVENOUS ONCE
Status: COMPLETED | OUTPATIENT
Start: 2019-06-20 | End: 2019-06-20

## 2019-06-20 RX ORDER — HYDROMORPHONE HCL/PF 1 MG/ML
1 SYRINGE (ML) INJECTION ONCE
Status: COMPLETED | OUTPATIENT
Start: 2019-06-20 | End: 2019-06-20

## 2019-06-20 RX ORDER — ONDANSETRON 2 MG/ML
4 INJECTION INTRAMUSCULAR; INTRAVENOUS ONCE
Status: COMPLETED | OUTPATIENT
Start: 2019-06-20 | End: 2019-06-20

## 2019-06-20 RX ORDER — SODIUM CHLORIDE 9 MG/ML
1000 INJECTION, SOLUTION INTRAVENOUS ONCE
Status: COMPLETED | OUTPATIENT
Start: 2019-06-20 | End: 2019-06-21

## 2019-06-20 RX ADMIN — IOHEXOL 100 ML: 350 INJECTION, SOLUTION INTRAVENOUS at 22:52

## 2019-06-20 RX ADMIN — SODIUM CHLORIDE 1000 ML/HR: 0.9 INJECTION, SOLUTION INTRAVENOUS at 21:57

## 2019-06-20 RX ADMIN — HYDROMORPHONE HYDROCHLORIDE 1 MG: 1 INJECTION, SOLUTION INTRAMUSCULAR; INTRAVENOUS; SUBCUTANEOUS at 22:02

## 2019-06-20 RX ADMIN — HYDROMORPHONE HYDROCHLORIDE 1 MG: 1 INJECTION, SOLUTION INTRAMUSCULAR; INTRAVENOUS; SUBCUTANEOUS at 23:39

## 2019-06-20 RX ADMIN — IOHEXOL 85 ML: 350 INJECTION, SOLUTION INTRAVENOUS at 23:57

## 2019-06-20 RX ADMIN — SODIUM CHLORIDE 1000 ML/HR: 0.9 INJECTION, SOLUTION INTRAVENOUS at 23:39

## 2019-06-20 RX ADMIN — ONDANSETRON 4 MG: 2 INJECTION INTRAMUSCULAR; INTRAVENOUS at 22:02

## 2019-06-21 VITALS
HEART RATE: 90 BPM | SYSTOLIC BLOOD PRESSURE: 122 MMHG | RESPIRATION RATE: 20 BRPM | OXYGEN SATURATION: 95 % | DIASTOLIC BLOOD PRESSURE: 84 MMHG

## 2019-06-21 LAB
BACTERIA UR QL AUTO: ABNORMAL /HPF
BILIRUB UR QL STRIP: NEGATIVE
CLARITY UR: CLEAR
COLOR UR: YELLOW
GLUCOSE UR STRIP-MCNC: NEGATIVE MG/DL
HGB UR QL STRIP.AUTO: ABNORMAL
KETONES UR STRIP-MCNC: NEGATIVE MG/DL
LEUKOCYTE ESTERASE UR QL STRIP: NEGATIVE
NITRITE UR QL STRIP: NEGATIVE
NON-SQ EPI CELLS URNS QL MICRO: ABNORMAL /HPF
NT-PROBNP SERPL-MCNC: 23 PG/ML
PH UR STRIP.AUTO: 6.5 [PH]
PROT UR STRIP-MCNC: NEGATIVE MG/DL
RBC #/AREA URNS AUTO: ABNORMAL /HPF
SP GR UR STRIP.AUTO: <=1.005 (ref 1–1.03)
TROPONIN I SERPL-MCNC: <0.02 NG/ML
UROBILINOGEN UR QL STRIP.AUTO: 0.2 E.U./DL
WBC #/AREA URNS AUTO: ABNORMAL /HPF

## 2019-06-21 PROCEDURE — 96361 HYDRATE IV INFUSION ADD-ON: CPT

## 2019-06-21 PROCEDURE — 81001 URINALYSIS AUTO W/SCOPE: CPT | Performed by: EMERGENCY MEDICINE

## 2019-06-21 PROCEDURE — 84484 ASSAY OF TROPONIN QUANT: CPT | Performed by: EMERGENCY MEDICINE

## 2019-06-21 PROCEDURE — 36415 COLL VENOUS BLD VENIPUNCTURE: CPT | Performed by: EMERGENCY MEDICINE

## 2019-06-21 RX ADMIN — APIXABAN 10 MG: 5 TABLET, FILM COATED ORAL at 01:38

## 2019-06-24 ENCOUNTER — OFFICE VISIT (OUTPATIENT)
Dept: FAMILY MEDICINE CLINIC | Facility: CLINIC | Age: 29
End: 2019-06-24
Payer: COMMERCIAL

## 2019-06-24 VITALS
HEIGHT: 66 IN | TEMPERATURE: 98.4 F | BODY MASS INDEX: 29.09 KG/M2 | HEART RATE: 82 BPM | DIASTOLIC BLOOD PRESSURE: 88 MMHG | RESPIRATION RATE: 18 BRPM | WEIGHT: 181 LBS | SYSTOLIC BLOOD PRESSURE: 138 MMHG | OXYGEN SATURATION: 98 %

## 2019-06-24 DIAGNOSIS — I26.99 BILATERAL PULMONARY EMBOLISM (HCC): Primary | ICD-10-CM

## 2019-06-24 DIAGNOSIS — Z79.01 CURRENT USE OF ANTICOAGULANT THERAPY: ICD-10-CM

## 2019-06-24 DIAGNOSIS — I26.99 PULMONARY EMBOLISM (HCC): ICD-10-CM

## 2019-06-24 DIAGNOSIS — J30.2 SEASONAL ALLERGIES: ICD-10-CM

## 2019-06-24 DIAGNOSIS — R09.81 SINUS CONGESTION: ICD-10-CM

## 2019-06-24 PROCEDURE — 3008F BODY MASS INDEX DOCD: CPT | Performed by: NURSE PRACTITIONER

## 2019-06-24 PROCEDURE — 99214 OFFICE O/P EST MOD 30 MIN: CPT | Performed by: NURSE PRACTITIONER

## 2019-06-24 RX ORDER — AZITHROMYCIN 250 MG/1
500 TABLET, FILM COATED ORAL EVERY 24 HOURS
Qty: 10 TABLET | Refills: 0 | Status: SHIPPED | OUTPATIENT
Start: 2019-06-24 | End: 2019-06-29

## 2019-06-24 RX ORDER — NORETHINDRONE ACETATE AND ETHINYL ESTRADIOL 1.5-30(21)
KIT ORAL
COMMUNITY
Start: 2018-09-06 | End: 2019-06-24

## 2019-06-24 RX ORDER — MONTELUKAST SODIUM 10 MG/1
10 TABLET ORAL
Qty: 90 TABLET | Refills: 3 | Status: SHIPPED | OUTPATIENT
Start: 2019-06-24 | End: 2020-02-28

## 2019-07-19 DIAGNOSIS — I26.99 PULMONARY EMBOLISM (HCC): ICD-10-CM

## 2019-07-19 NOTE — TELEPHONE ENCOUNTER
Patient is on her last pill today, she mentioned provider would refill when she needed  Please advise

## 2019-07-23 DIAGNOSIS — I26.99 PULMONARY EMBOLISM (HCC): ICD-10-CM

## 2019-07-23 NOTE — TELEPHONE ENCOUNTER
Fyi:  Pt called stating the wrong med was phoned in for her - she needs eliquis not allergy meds    She said she was out of blood thinners since Friday  I looked in chart and eliquis was erx'ed Friday, I called [facility] they said they had a rx for it but they were out of the med the shipment  would be in today/ tomorrow  I said pt was unaware and out of meds since Friday,  Lin was notified - she was upset pharmacy never told her that,  she would have had rx transferred to get meds and not go without for 4 days   She will stop at [facility] phar on her way home if rx not there she will [facility]

## 2019-07-26 DIAGNOSIS — G89.29 OTHER CHRONIC PAIN: ICD-10-CM

## 2019-07-26 RX ORDER — NAPROXEN 500 MG/1
TABLET ORAL
Qty: 60 TABLET | Refills: 1 | Status: SHIPPED | OUTPATIENT
Start: 2019-07-26 | End: 2019-08-20 | Stop reason: SDUPTHER

## 2019-08-09 ENCOUNTER — CONSULT (OUTPATIENT)
Dept: HEMATOLOGY ONCOLOGY | Facility: CLINIC | Age: 29
End: 2019-08-09
Payer: COMMERCIAL

## 2019-08-09 VITALS
SYSTOLIC BLOOD PRESSURE: 110 MMHG | BODY MASS INDEX: 29.65 KG/M2 | RESPIRATION RATE: 18 BRPM | HEART RATE: 99 BPM | DIASTOLIC BLOOD PRESSURE: 90 MMHG | WEIGHT: 184.5 LBS | TEMPERATURE: 99.4 F | HEIGHT: 66 IN

## 2019-08-09 DIAGNOSIS — I26.99 BILATERAL PULMONARY EMBOLISM (HCC): Primary | ICD-10-CM

## 2019-08-09 DIAGNOSIS — Z91.89 AT HIGH RISK FOR BLEEDING: ICD-10-CM

## 2019-08-09 DIAGNOSIS — I26.99 OTHER PULMONARY EMBOLISM WITHOUT ACUTE COR PULMONALE, UNSPECIFIED CHRONICITY (HCC): ICD-10-CM

## 2019-08-09 PROCEDURE — 99243 OFF/OP CNSLTJ NEW/EST LOW 30: CPT | Performed by: INTERNAL MEDICINE

## 2019-08-09 NOTE — PROGRESS NOTES
HEMATOLOGY / [address] NOTE    Primary Care Provider: ADORE Jacob  Referring Provider: Latoya Woods  MRN: [id number]  : 1990    Reason for Encounter:  Chief Complaint   Patient presents with   Molltangela Brumfieldr Consult     PE ref Deangelo Langston         Hematology / Oncology History:     Paul Aragon is a 25 y o  female who came in for follow up      1, pulmonary emboli  - 2019 :  unprovoked; smoke cigarettes, rheumatoid arthritis,  family history of DVT ( patient mom have a DVT at age 36, likely triggered by trauma)  - on Eliquis    Assessment / Plan:     1  Bilateral pulmonary embolism (HCC)  - risk factor including cigarette smoking, family history of DVT, ? Rheumatoid arthritis  - patient is status post  3 years ago without any issues  - patient will continue Eliquis for total 6 months  She will then hold Eliquis and proceed with thrombosis panel and lower extremity Doppler, will follow patient after, depending result we decided how long to continue anticoagulation       - Thrombosis Panel; Future  - D-dimer, quantitative; Future  - CBC and differential; Future  - VAS lower limb venous duplex study, complete bilateral; Future    2  Other pulmonary embolism without acute cor pulmonale, unspecified chronicity (HCC)  - as above    3, high risk for bleeding             45       minutes were spent face to face with patient with greater than 50% of the time spent in counseling or coordination of care including discussions of treatment instructions  All of the patient's questions were answered to their satisfactory during this discussion  Advised pt to call if there is any further questions         Interval History:     2019 :  70-year-old female, with history of active cigarette smoking, rheumatoid arthritis, developed acute onset chest pain and dyspnea, admitted to ED in , diagnosed PE, current taking Eliquis, symptom resolved, however for the past 2 weeks patient had episode of similar but to a much less severity chest pain, lasted for about  1-2 hours  She came in to establish care with hematology for disease additional episodes of chest pain  She reported there is no leg swelling, before the diagnosis of PE, she has no trauma surgery long travel, she is not on birth control pills or hormone replacement therapy, she has no history of malignancy, she has a history of questionable rheumatoid arthritis, she is active smoker, she reported family history of DVT  She had a pregnancy and  about 3 years ago without complications  She has no bleeding issues in the past     Currently working as a  in [employer]  Problem list:     Patient Active Problem List   Diagnosis    Encounter to establish care    Mild intermittent asthma without complication    Scabies exposure    Migraine with aura and without status migrainosus, not intractable    Moderate persistent asthma without complication    Rheumatoid arthritis involving both knees (HCC)    Arthralgia of both knees    Need for lipid screening    Non-seasonal allergic rhinitis due to pollen    Cigarette nicotine dependence without complication    Cellulitis and abscess of buttock    URI with cough and congestion    Need for influenza vaccination    Need for Tdap vaccination    Oral thrush    Recurrent sinusitis    Left elbow pain    Pulmonary embolism ([address] )    Current use of anticoagulant therapy    Sinus congestion    Seasonal allergies       PHYSICIAL EXAMINATION:       Vital Signs:   [unfilled]  Body mass index is 29 78 kg/m²  Body surface area is 1 93 meters squared  No major finding on physical exam    GEN: Alert, awake oriented x3, in no acute distress  HEENT- No pallor, icterus, cyanosis, no oral mucosal lesions,   LAD - no palpable cervical, clavicle, axillary, inguinal LAD  Heart- normal S1 S2, regular rate and rhythm, No murmur, rubs     Lungs- decreased breathing sound bilateral    Abdomen- soft, Non tender, bowel sounds present  Extremities- No cyanosis, clubbing, edema  Neuro- No focal neurological deficit           PAST MEDICAL HISTORY:   has a past medical history of Allergic, Anxiety, Asthma, Migraine, and Pneumonia  PAST SURGICAL HISTORY:   has a past surgical history that includes  section and Femur fracture surgery  CURRENT MEDICATIONS:   Current Outpatient Medications   Medication Sig Dispense Refill    apixaban (ELIQUIS) 5 mg Take 1 tablet (5 mg total) by mouth 2 (two) times a day 60 tablet 0    FLOVENT  MCG/ACT inhaler Inhale 1 puff 2 (two) times a day 12 g 3    montelukast (SINGULAIR) 10 mg tablet Take 1 tablet (10 mg total) by mouth daily at bedtime 90 tablet 3    naproxen (NAPROSYN) 500 mg tablet TAKE 1/2 TABLET (250 MG TOTAL) BY MOUTH 2 (TWO) TIMES A DAY WITH MEALS 60 tablet 1    topiramate (TOPAMAX) 200 MG tablet Take 1 tablet (200 mg total) by mouth 2 (two) times a day 60 tablet 5    VENTOLIN  (90 Base) MCG/ACT inhaler INHALE 2 PUFFS EVERY 6 HOURS AS NEEDED FOR WHEEZING  3    ibuprofen (MOTRIN) 800 mg tablet Take 1 tablet (800 mg total) by mouth every 6 (six) hours as needed for moderate pain for up to 30 days 90 tablet 0     No current facility-administered medications for this visit  [unfilled]    SOCIAL HISTORY:   reports that she has been smoking cigarettes  She has been smoking about 0 25 packs per day  She has never used smokeless tobacco  She reports that she does not drink alcohol or use drugs  FAMILY HISTORY:  family history includes Asthma in her daughter and son; Diabetes in her mother; Hypertension in her father  ALLERGIES:  is allergic to diphenhydramine; bee venom; ciprofloxacin; doxycycline; and sulfasalazine      REVIEW OF SYSTEMS:  Please note that a 14-point review of systems was performed to include Constitutional, HEENT, Respiratory, CVS, GI, , Musculoskeletal, Integumentary, Neurologic, Rheumatologic, Endocrinologic, Psychiatric, Lymphatic, and Hematologic/Oncologic systems were reviewed and are negative unless otherwise stated in HPI  Positive and negative findings pertinent to this evaluation are incorporated into the history of present illness  LAB:  Lab Results   Component Value Date    WBC 9 95 06/20/2019    HGB 14 3 06/20/2019    HCT 42 8 06/20/2019     (H) 06/20/2019     06/20/2019     Lab Results   Component Value Date    SODIUM 143 06/20/2019    K 3 6 06/20/2019     06/20/2019    CO2 23 06/20/2019    AGAP 13 06/20/2019    BUN 17 06/20/2019    CREATININE 0 88 06/20/2019    GLUC 100 06/20/2019    GLUF 77 06/05/2018    CALCIUM 8 8 06/20/2019    AST 22 06/20/2019    ALT 28 06/20/2019    ALKPHOS 82 06/20/2019    TP 8 1 06/20/2019    TBILI 0 50 06/20/2019    EGFR 88 06/20/2019       CBC with diff:       Invalid input(s):  RBC, TOTALCELLSCOUNTED, SEGS%, GRANS%, LYMPHS%, EOS%, BASO%, ABNEUT, ABGRANS, ABLYMPHS, ABMOMOS, ABEOS, ABBASO    CMP:      Invalid input(s): ALBUMIN        IMAGING:  VAS lower limb venous duplex study, complete bilateral    (Results Pending)     No results found

## 2019-08-20 DIAGNOSIS — G89.29 OTHER CHRONIC PAIN: ICD-10-CM

## 2019-08-20 RX ORDER — NAPROXEN 500 MG/1
TABLET ORAL
Qty: 60 TABLET | Refills: 1 | Status: SHIPPED | OUTPATIENT
Start: 2019-08-20 | End: 2020-06-16

## 2019-10-07 DIAGNOSIS — J45.20 MILD INTERMITTENT ASTHMA WITHOUT COMPLICATION: Primary | ICD-10-CM

## 2019-10-07 DIAGNOSIS — I26.99 PULMONARY EMBOLISM (HCC): ICD-10-CM

## 2019-10-07 DIAGNOSIS — G89.29 OTHER CHRONIC PAIN: ICD-10-CM

## 2019-10-07 DIAGNOSIS — G43.909 MIGRAINE WITHOUT STATUS MIGRAINOSUS, NOT INTRACTABLE, UNSPECIFIED MIGRAINE TYPE: ICD-10-CM

## 2019-10-07 RX ORDER — NAPROXEN 500 MG/1
TABLET ORAL
Qty: 60 TABLET | Refills: 1 | Status: SHIPPED | OUTPATIENT
Start: 2019-10-07 | End: 2019-12-04 | Stop reason: SDUPTHER

## 2019-10-07 RX ORDER — APIXABAN 5 MG/1
TABLET, FILM COATED ORAL
Qty: 60 TABLET | Refills: 0 | Status: SHIPPED | OUTPATIENT
Start: 2019-10-07 | End: 2019-11-06 | Stop reason: SDUPTHER

## 2019-11-06 DIAGNOSIS — I26.99 PULMONARY EMBOLISM (HCC): ICD-10-CM

## 2019-11-06 DIAGNOSIS — J45.20 MILD INTERMITTENT ASTHMA WITHOUT COMPLICATION: ICD-10-CM

## 2019-11-06 RX ORDER — APIXABAN 5 MG/1
TABLET, FILM COATED ORAL
Qty: 60 TABLET | Refills: 0 | Status: SHIPPED | OUTPATIENT
Start: 2019-11-06 | End: 2019-12-03 | Stop reason: SDUPTHER

## 2019-11-06 RX ORDER — ALBUTEROL SULFATE 90 UG/1
AEROSOL, METERED RESPIRATORY (INHALATION)
Qty: 18 G | Refills: 0 | Status: SHIPPED | OUTPATIENT
Start: 2019-11-06 | End: 2019-12-19 | Stop reason: SDUPTHER

## 2019-12-03 DIAGNOSIS — I26.99 PULMONARY EMBOLISM (HCC): ICD-10-CM

## 2019-12-03 DIAGNOSIS — J45.40 MODERATE PERSISTENT ASTHMA WITHOUT COMPLICATION: ICD-10-CM

## 2019-12-03 RX ORDER — APIXABAN 5 MG/1
TABLET, FILM COATED ORAL
Qty: 60 TABLET | Refills: 0 | Status: SHIPPED | OUTPATIENT
Start: 2019-12-03 | End: 2020-02-19

## 2019-12-03 RX ORDER — FLUTICASONE PROPIONATE 220 UG/1
1 AEROSOL, METERED RESPIRATORY (INHALATION) 2 TIMES DAILY
Qty: 1 INHALER | Refills: 1 | Status: SHIPPED | OUTPATIENT
Start: 2019-12-03

## 2019-12-04 DIAGNOSIS — G89.29 OTHER CHRONIC PAIN: ICD-10-CM

## 2019-12-04 RX ORDER — NAPROXEN 500 MG/1
TABLET ORAL
Qty: 60 TABLET | Refills: 0 | Status: SHIPPED | OUTPATIENT
Start: 2019-12-04 | End: 2020-06-16

## 2019-12-19 DIAGNOSIS — J45.20 MILD INTERMITTENT ASTHMA WITHOUT COMPLICATION: ICD-10-CM

## 2019-12-19 RX ORDER — ALBUTEROL SULFATE 90 UG/1
AEROSOL, METERED RESPIRATORY (INHALATION)
Qty: 18 G | Refills: 0 | Status: SHIPPED | OUTPATIENT
Start: 2019-12-19

## 2020-02-18 DIAGNOSIS — I26.99 PULMONARY EMBOLISM (HCC): ICD-10-CM

## 2020-02-19 RX ORDER — APIXABAN 5 MG/1
TABLET, FILM COATED ORAL
Qty: 60 TABLET | Refills: 0 | Status: SHIPPED | OUTPATIENT
Start: 2020-02-19 | End: 2020-03-18

## 2020-02-20 DIAGNOSIS — G43.909 MIGRAINE WITHOUT STATUS MIGRAINOSUS, NOT INTRACTABLE, UNSPECIFIED MIGRAINE TYPE: ICD-10-CM

## 2020-02-28 DIAGNOSIS — J30.2 SEASONAL ALLERGIES: ICD-10-CM

## 2020-02-28 RX ORDER — MONTELUKAST SODIUM 10 MG/1
TABLET ORAL
Qty: 90 TABLET | Refills: 3 | Status: SHIPPED | OUTPATIENT
Start: 2020-02-28 | End: 2021-01-14

## 2020-03-18 DIAGNOSIS — I26.99 PULMONARY EMBOLISM (HCC): ICD-10-CM

## 2020-03-18 RX ORDER — APIXABAN 5 MG/1
TABLET, FILM COATED ORAL
Qty: 60 TABLET | Refills: 0 | Status: SHIPPED | OUTPATIENT
Start: 2020-03-18 | End: 2020-04-16

## 2020-04-16 DIAGNOSIS — I26.99 PULMONARY EMBOLISM (HCC): ICD-10-CM

## 2020-04-16 RX ORDER — APIXABAN 5 MG/1
TABLET, FILM COATED ORAL
Qty: 60 TABLET | Refills: 0 | Status: SHIPPED | OUTPATIENT
Start: 2020-04-16 | End: 2020-05-11

## 2020-05-09 DIAGNOSIS — I26.99 PULMONARY EMBOLISM (HCC): ICD-10-CM

## 2020-05-11 RX ORDER — APIXABAN 5 MG/1
TABLET, FILM COATED ORAL
Qty: 60 TABLET | Refills: 0 | Status: SHIPPED | OUTPATIENT
Start: 2020-05-11 | End: 2020-06-08

## 2020-06-06 DIAGNOSIS — I26.99 PULMONARY EMBOLISM (HCC): ICD-10-CM

## 2020-06-08 RX ORDER — APIXABAN 5 MG/1
TABLET, FILM COATED ORAL
Qty: 60 TABLET | Refills: 0 | Status: SHIPPED | OUTPATIENT
Start: 2020-06-08 | End: 2022-08-02

## 2020-06-16 ENCOUNTER — OFFICE VISIT (OUTPATIENT)
Dept: FAMILY MEDICINE CLINIC | Facility: CLINIC | Age: 30
End: 2020-06-16
Payer: COMMERCIAL

## 2020-06-16 VITALS
HEART RATE: 82 BPM | WEIGHT: 162 LBS | SYSTOLIC BLOOD PRESSURE: 118 MMHG | RESPIRATION RATE: 18 BRPM | HEIGHT: 66 IN | DIASTOLIC BLOOD PRESSURE: 88 MMHG | OXYGEN SATURATION: 98 % | BODY MASS INDEX: 26.03 KG/M2

## 2020-06-16 DIAGNOSIS — F41.1 GAD (GENERALIZED ANXIETY DISORDER): ICD-10-CM

## 2020-06-16 DIAGNOSIS — Z79.01 CURRENT USE OF ANTICOAGULANT THERAPY: ICD-10-CM

## 2020-06-16 DIAGNOSIS — G43.109 MIGRAINE WITH AURA AND WITHOUT STATUS MIGRAINOSUS, NOT INTRACTABLE: Primary | ICD-10-CM

## 2020-06-16 PROCEDURE — 99214 OFFICE O/P EST MOD 30 MIN: CPT | Performed by: NURSE PRACTITIONER

## 2020-06-16 PROCEDURE — 3008F BODY MASS INDEX DOCD: CPT | Performed by: NURSE PRACTITIONER

## 2020-06-16 RX ORDER — BUSPIRONE HYDROCHLORIDE 5 MG/1
5 TABLET ORAL 2 TIMES DAILY
Qty: 180 TABLET | Refills: 3 | Status: SHIPPED | OUTPATIENT
Start: 2020-06-16 | End: 2022-08-02

## 2020-06-16 RX ORDER — GABAPENTIN 100 MG/1
100 CAPSULE ORAL 2 TIMES DAILY
Qty: 60 CAPSULE | Refills: 2 | Status: SHIPPED | OUTPATIENT
Start: 2020-06-16 | End: 2020-08-05

## 2020-07-20 ENCOUNTER — OFFICE VISIT (OUTPATIENT)
Dept: FAMILY MEDICINE CLINIC | Facility: CLINIC | Age: 30
End: 2020-07-20
Payer: COMMERCIAL

## 2020-07-20 VITALS
DIASTOLIC BLOOD PRESSURE: 84 MMHG | WEIGHT: 162 LBS | BODY MASS INDEX: 26.03 KG/M2 | RESPIRATION RATE: 18 BRPM | OXYGEN SATURATION: 98 % | HEIGHT: 66 IN | HEART RATE: 88 BPM | SYSTOLIC BLOOD PRESSURE: 124 MMHG

## 2020-07-20 DIAGNOSIS — N63.15 BREAST LUMP ON RIGHT SIDE AT 12 O'CLOCK POSITION: Primary | ICD-10-CM

## 2020-07-20 DIAGNOSIS — N63.11 BREAST LUMP ON RIGHT SIDE AT 11 O'CLOCK POSITION: ICD-10-CM

## 2020-07-20 DIAGNOSIS — Z80.3 FAMILY HISTORY OF BREAST CANCER: ICD-10-CM

## 2020-07-20 PROCEDURE — 3008F BODY MASS INDEX DOCD: CPT | Performed by: NURSE PRACTITIONER

## 2020-07-20 PROCEDURE — 99213 OFFICE O/P EST LOW 20 MIN: CPT | Performed by: NURSE PRACTITIONER

## 2020-07-20 NOTE — PATIENT INSTRUCTIONS
Bilateral breast lumps- suspect hormonal in the setting of dense breast tissue  Due to family history of breast cancer, will check US  May take Advil or Ibuprofen for comfort    Fibrocystic Breast Changes   WHAT YOU NEED TO KNOW:   Fibrocystic changes are changes in your breast tissue  Your breast tissue may have small cysts, benign lumps (not cancer), or thickened areas  These breast tissue changes are common in women who have not gone through menopause  Fibrocystic breast changes do not increase your risk of breast cancer  The cause of fibrocystic breast changes is unknown  They may be related to hormonal changes that occur during your menstrual cycle  DISCHARGE INSTRUCTIONS:   Contact your healthcare provider if:   · You notice other changes in your breasts or nipples, such as dimpling or a rash  · You have bloody nipple discharge  · You have a fever  · You have questions or concerns about your condition or care  Medicines: You may need any of the following:  · NSAIDs , such as ibuprofen, help decrease swelling, pain, and fever  This medicine is available with or without a doctor's order  NSAIDs can cause stomach bleeding or kidney problems in certain people  If you take blood thinner medicine, always ask your healthcare provider if NSAIDs are safe for you  Always read the medicine label and follow directions  · Oral contraceptives  (birth control pills) may be recommended by your healthcare provider  These may help to decrease the level of hormones that worsen your signs and symptoms  · Take your medicine as directed  Contact your healthcare provider if you think your medicine is not helping or if you have side effects  Tell him of her if you are allergic to any medicine  Keep a list of the medicines, vitamins, and herbs you take  Include the amounts, and when and why you take them  Bring the list or the pill bottles to follow-up visits   Carry your medicine list with you in case of an emergency  Manage your symptoms:   · Apply heat  on your breasts for 20 to 30 minutes every 2 hours for as many days as directed  Heat helps decrease pain and muscle spasms  · Apply ice  on your breasts for 15 to 20 minutes every hour or as directed  Use an ice pack, or put crushed ice in a plastic bag  Cover it with a towel  Ice helps prevent tissue damage and decreases swelling and pain  · Wear a well-fitted, supportive bra  It may help to relieve pain and swelling  · Avoid or limit caffeine  This may help to decrease symptoms in some women  Caffeine is found in coffee, tea, sodas, and chocolate  Continue breast self-exams:  Check your breast for changes in size, shape, or feel of the breast tissue  Check under your arms and all around your breasts  If you have monthly periods, examine your breasts after your period is over  Contact your healthcare provider if you notice any changes in your breasts  If you have questions, ask for more information about how to do a breast self-exam         Follow up with your healthcare provider as directed:  Write down your questions so you remember to ask them during your visits  © 2017 [facility] Information is for End User's use only and may not be sold, redistributed or otherwise used for commercial purposes  All illustrations and images included in CareNotes® are the copyrighted property of [employer]  or Ronald Sant[employer]  The above information is an  only  It is not intended as medical advice for individual conditions or treatments  Talk to your doctor, nurse or pharmacist before following any medical regimen to see if it is safe and effective for you

## 2020-08-05 DIAGNOSIS — F41.1 GAD (GENERALIZED ANXIETY DISORDER): ICD-10-CM

## 2020-08-05 RX ORDER — GABAPENTIN 100 MG/1
100 CAPSULE ORAL 2 TIMES DAILY
Qty: 60 CAPSULE | Refills: 2 | Status: SHIPPED | OUTPATIENT
Start: 2020-08-05 | End: 2020-10-19

## 2020-10-19 DIAGNOSIS — F41.1 GAD (GENERALIZED ANXIETY DISORDER): ICD-10-CM

## 2020-10-19 RX ORDER — GABAPENTIN 100 MG/1
100 CAPSULE ORAL 2 TIMES DAILY
Qty: 60 CAPSULE | Refills: 2 | Status: SHIPPED | OUTPATIENT
Start: 2020-10-19 | End: 2021-01-20

## 2021-01-14 ENCOUNTER — APPOINTMENT (EMERGENCY)
Dept: RADIOLOGY | Facility: HOSPITAL | Age: 31
End: 2021-01-14
Payer: COMMERCIAL

## 2021-01-14 ENCOUNTER — APPOINTMENT (EMERGENCY)
Dept: CT IMAGING | Facility: HOSPITAL | Age: 31
End: 2021-01-14
Payer: COMMERCIAL

## 2021-01-14 ENCOUNTER — HOSPITAL ENCOUNTER (EMERGENCY)
Facility: HOSPITAL | Age: 31
Discharge: HOME | End: 2021-01-14
Attending: EMERGENCY MEDICINE | Admitting: EMERGENCY MEDICINE
Payer: COMMERCIAL

## 2021-01-14 VITALS
RESPIRATION RATE: 38 BRPM | OXYGEN SATURATION: 96 % | HEART RATE: 94 BPM | BODY MASS INDEX: 26.52 KG/M2 | TEMPERATURE: 97.6 F | HEIGHT: 66 IN | DIASTOLIC BLOOD PRESSURE: 78 MMHG | SYSTOLIC BLOOD PRESSURE: 126 MMHG | WEIGHT: 165 LBS

## 2021-01-14 DIAGNOSIS — R05.9 COUGH: Primary | ICD-10-CM

## 2021-01-14 DIAGNOSIS — R55 SYNCOPAL EPISODES: ICD-10-CM

## 2021-01-14 LAB
ALBUMIN SERPL BCP-MCNC: 3.8 G/DL (ref 3.5–5)
ALP SERPL-CCNC: 60 U/L (ref 46–116)
ALT SERPL W P-5'-P-CCNC: 52 U/L (ref 12–78)
AMPHETAMINES SERPL QL SCN: NEGATIVE
ANION GAP SERPL CALCULATED.3IONS-SCNC: 11 MMOL/L (ref 4–13)
APAP SERPL-MCNC: <2 UG/ML (ref 10–20)
APTT PPP: 22 SECONDS (ref 23–37)
AST SERPL W P-5'-P-CCNC: 20 U/L (ref 5–45)
BACTERIA UR QL AUTO: ABNORMAL /HPF
BARBITURATES UR QL: NEGATIVE
BASE EX.OXY STD BLDV CALC-SCNC: 89 % (ref 60–80)
BASE EXCESS BLDV CALC-SCNC: -5.7 MMOL/L
BASOPHILS # BLD AUTO: 0.05 THOUSANDS/ΜL (ref 0–0.1)
BASOPHILS NFR BLD AUTO: 0 % (ref 0–1)
BENZODIAZ UR QL: NEGATIVE
BETA-HYDROXYBUTYRATE: 0.1 MMOL/L
BILIRUB SERPL-MCNC: 0.2 MG/DL (ref 0.2–1)
BILIRUB UR QL STRIP: NEGATIVE
BUN SERPL-MCNC: 10 MG/DL (ref 5–25)
CALCIUM SERPL-MCNC: 8.5 MG/DL (ref 8.3–10.1)
CHLORIDE SERPL-SCNC: 104 MMOL/L (ref 100–108)
CLARITY UR: ABNORMAL
CO2 SERPL-SCNC: 24 MMOL/L (ref 21–32)
COCAINE UR QL: NEGATIVE
COLOR UR: YELLOW
CREAT SERPL-MCNC: 1.03 MG/DL (ref 0.6–1.3)
EOSINOPHIL # BLD AUTO: 0.03 THOUSAND/ΜL (ref 0–0.61)
EOSINOPHIL NFR BLD AUTO: 0 % (ref 0–6)
ERYTHROCYTE [DISTWIDTH] IN BLOOD BY AUTOMATED COUNT: 12.2 % (ref 11.6–15.1)
ETHANOL SERPL-MCNC: <3 MG/DL (ref 0–3)
FLUAV RNA RESP QL NAA+PROBE: NEGATIVE
FLUBV RNA RESP QL NAA+PROBE: NEGATIVE
GAS + CO PNL BLDA: 2.5 % (ref 0–1.5)
GFR SERPL CREATININE-BSD FRML MDRD: 72 ML/MIN/1.73SQ M
GLUCOSE SERPL-MCNC: 243 MG/DL (ref 65–140)
GLUCOSE SERPL-MCNC: 268 MG/DL (ref 65–140)
GLUCOSE UR STRIP-MCNC: ABNORMAL MG/DL
HCO3 BLDV-SCNC: 20.7 MMOL/L (ref 24–30)
HCT VFR BLD AUTO: 43.7 % (ref 34.8–46.1)
HGB BLD-MCNC: 13.9 G/DL (ref 11.5–15.4)
HGB UR QL STRIP.AUTO: ABNORMAL
IMM GRANULOCYTES # BLD AUTO: 0.11 THOUSAND/UL (ref 0–0.2)
IMM GRANULOCYTES NFR BLD AUTO: 1 % (ref 0–2)
INR PPP: 0.92 (ref 0.84–1.19)
KETONES UR STRIP-MCNC: NEGATIVE MG/DL
LACTATE SERPL-SCNC: 2.1 MMOL/L (ref 0.5–2)
LEUKOCYTE ESTERASE UR QL STRIP: NEGATIVE
LYMPHOCYTES # BLD AUTO: 1.85 THOUSANDS/ΜL (ref 0.6–4.47)
LYMPHOCYTES NFR BLD AUTO: 13 % (ref 14–44)
MCH RBC QN AUTO: 30.8 PG (ref 26.8–34.3)
MCHC RBC AUTO-ENTMCNC: 31.8 G/DL (ref 31.4–37.4)
MCV RBC AUTO: 97 FL (ref 82–98)
METHADONE UR QL: NEGATIVE
MONOCYTES # BLD AUTO: 0.74 THOUSAND/ΜL (ref 0.17–1.22)
MONOCYTES NFR BLD AUTO: 5 % (ref 4–12)
NEUTROPHILS # BLD AUTO: 11.33 THOUSANDS/ΜL (ref 1.85–7.62)
NEUTS SEG NFR BLD AUTO: 81 % (ref 43–75)
NITRITE UR QL STRIP: NEGATIVE
NON-SQ EPI CELLS URNS QL MICRO: ABNORMAL /HPF
NRBC BLD AUTO-RTO: 0 /100 WBCS
O2 CT BLDV-SCNC: 18.4 ML/DL
OPIATES UR QL SCN: NEGATIVE
OXYCODONE+OXYMORPHONE UR QL SCN: NEGATIVE
PCO2 BLDV: 44 MM HG (ref 42–50)
PCP UR QL: NEGATIVE
PH BLDV: 7.29 [PH] (ref 7.3–7.4)
PH UR STRIP.AUTO: 7 [PH]
PLATELET # BLD AUTO: 320 THOUSANDS/UL (ref 149–390)
PMV BLD AUTO: 9 FL (ref 8.9–12.7)
PO2 BLDV: 68.6 MM HG (ref 35–45)
POTASSIUM SERPL-SCNC: 3.7 MMOL/L (ref 3.5–5.3)
PROCALCITONIN SERPL-MCNC: <0.05 NG/ML
PROT SERPL-MCNC: 7.7 G/DL (ref 6.4–8.2)
PROT UR STRIP-MCNC: ABNORMAL MG/DL
PROTHROMBIN TIME: 11.9 SECONDS (ref 11.6–14.5)
RBC # BLD AUTO: 4.51 MILLION/UL (ref 3.81–5.12)
RBC #/AREA URNS AUTO: ABNORMAL /HPF
RSV RNA RESP QL NAA+PROBE: NEGATIVE
SALICYLATES SERPL-MCNC: <3 MG/DL (ref 3–20)
SARS-COV-2 RNA RESP QL NAA+PROBE: NEGATIVE
SODIUM SERPL-SCNC: 139 MMOL/L (ref 136–145)
SP GR UR STRIP.AUTO: 1.02 (ref 1–1.03)
THC UR QL: POSITIVE
UROBILINOGEN UR QL STRIP.AUTO: 0.2 E.U./DL
WBC # BLD AUTO: 14.11 THOUSAND/UL (ref 4.31–10.16)
WBC #/AREA URNS AUTO: ABNORMAL /HPF

## 2021-01-14 PROCEDURE — 85025 COMPLETE CBC W/AUTO DIFF WBC: CPT | Performed by: EMERGENCY MEDICINE

## 2021-01-14 PROCEDURE — 0241U HB NFCT DS VIR RESP RNA 4 TRGT: CPT | Performed by: EMERGENCY MEDICINE

## 2021-01-14 PROCEDURE — 82375 ASSAY CARBOXYHB QUANT: CPT | Performed by: EMERGENCY MEDICINE

## 2021-01-14 PROCEDURE — 96374 THER/PROPH/DIAG INJ IV PUSH: CPT

## 2021-01-14 PROCEDURE — 82805 BLOOD GASES W/O2 SATURATION: CPT | Performed by: EMERGENCY MEDICINE

## 2021-01-14 PROCEDURE — 85730 THROMBOPLASTIN TIME PARTIAL: CPT | Performed by: EMERGENCY MEDICINE

## 2021-01-14 PROCEDURE — 99285 EMERGENCY DEPT VISIT HI MDM: CPT | Performed by: EMERGENCY MEDICINE

## 2021-01-14 PROCEDURE — 70450 CT HEAD/BRAIN W/O DYE: CPT

## 2021-01-14 PROCEDURE — 82948 REAGENT STRIP/BLOOD GLUCOSE: CPT

## 2021-01-14 PROCEDURE — 84145 PROCALCITONIN (PCT): CPT | Performed by: EMERGENCY MEDICINE

## 2021-01-14 PROCEDURE — 85610 PROTHROMBIN TIME: CPT | Performed by: EMERGENCY MEDICINE

## 2021-01-14 PROCEDURE — 80307 DRUG TEST PRSMV CHEM ANLYZR: CPT | Performed by: EMERGENCY MEDICINE

## 2021-01-14 PROCEDURE — 80179 DRUG ASSAY SALICYLATE: CPT | Performed by: EMERGENCY MEDICINE

## 2021-01-14 PROCEDURE — 80143 DRUG ASSAY ACETAMINOPHEN: CPT | Performed by: EMERGENCY MEDICINE

## 2021-01-14 PROCEDURE — 83605 ASSAY OF LACTIC ACID: CPT | Performed by: EMERGENCY MEDICINE

## 2021-01-14 PROCEDURE — 87040 BLOOD CULTURE FOR BACTERIA: CPT | Performed by: EMERGENCY MEDICINE

## 2021-01-14 PROCEDURE — 82077 ASSAY SPEC XCP UR&BREATH IA: CPT | Performed by: EMERGENCY MEDICINE

## 2021-01-14 PROCEDURE — 71045 X-RAY EXAM CHEST 1 VIEW: CPT

## 2021-01-14 PROCEDURE — 96361 HYDRATE IV INFUSION ADD-ON: CPT

## 2021-01-14 PROCEDURE — 99285 EMERGENCY DEPT VISIT HI MDM: CPT

## 2021-01-14 PROCEDURE — 81001 URINALYSIS AUTO W/SCOPE: CPT | Performed by: EMERGENCY MEDICINE

## 2021-01-14 PROCEDURE — 82010 KETONE BODYS QUAN: CPT | Performed by: EMERGENCY MEDICINE

## 2021-01-14 PROCEDURE — 80053 COMPREHEN METABOLIC PANEL: CPT | Performed by: EMERGENCY MEDICINE

## 2021-01-14 PROCEDURE — 36415 COLL VENOUS BLD VENIPUNCTURE: CPT | Performed by: EMERGENCY MEDICINE

## 2021-01-14 PROCEDURE — 93005 ELECTROCARDIOGRAM TRACING: CPT

## 2021-01-14 RX ORDER — ONDANSETRON 2 MG/ML
4 INJECTION INTRAMUSCULAR; INTRAVENOUS ONCE
Status: COMPLETED | OUTPATIENT
Start: 2021-01-14 | End: 2021-01-14

## 2021-01-14 RX ORDER — SODIUM CHLORIDE 9 MG/ML
3 INJECTION INTRAVENOUS
Status: DISCONTINUED | OUTPATIENT
Start: 2021-01-14 | End: 2021-01-15 | Stop reason: HOSPADM

## 2021-01-14 RX ORDER — PREDNISONE 20 MG/1
40 TABLET ORAL DAILY
Qty: 10 TABLET | Refills: 0 | Status: SHIPPED | OUTPATIENT
Start: 2021-01-14 | End: 2021-01-19

## 2021-01-14 RX ORDER — ALBUTEROL SULFATE 90 UG/1
2 AEROSOL, METERED RESPIRATORY (INHALATION) EVERY 4 HOURS PRN
Qty: 1 INHALER | Refills: 0 | Status: SHIPPED | OUTPATIENT
Start: 2021-01-14

## 2021-01-14 RX ADMIN — SODIUM CHLORIDE 1000 ML: 0.9 INJECTION, SOLUTION INTRAVENOUS at 21:38

## 2021-01-14 RX ADMIN — ONDANSETRON 4 MG: 2 INJECTION INTRAMUSCULAR; INTRAVENOUS at 19:08

## 2021-01-14 NOTE — ED NOTES
Assumed  Care of pt at this time    Report received from Sanna Conroy, [address] [place] [address]  01/14/21 6698

## 2021-01-15 ENCOUNTER — OFFICE VISIT (OUTPATIENT)
Dept: FAMILY MEDICINE CLINIC | Facility: CLINIC | Age: 31
End: 2021-01-15
Payer: COMMERCIAL

## 2021-01-15 VITALS
BODY MASS INDEX: 32.44 KG/M2 | OXYGEN SATURATION: 97 % | WEIGHT: 201 LBS | DIASTOLIC BLOOD PRESSURE: 87 MMHG | SYSTOLIC BLOOD PRESSURE: 133 MMHG | TEMPERATURE: 97.9 F | HEART RATE: 94 BPM

## 2021-01-15 DIAGNOSIS — J40 BRONCHITIS: ICD-10-CM

## 2021-01-15 DIAGNOSIS — J45.41 MODERATE PERSISTENT ASTHMA WITH EXACERBATION: ICD-10-CM

## 2021-01-15 DIAGNOSIS — R11.0 NAUSEA: ICD-10-CM

## 2021-01-15 DIAGNOSIS — R56.9 SEIZURE-LIKE ACTIVITY (HCC): ICD-10-CM

## 2021-01-15 DIAGNOSIS — R73.09 ELEVATED GLUCOSE: ICD-10-CM

## 2021-01-15 DIAGNOSIS — N30.01 ACUTE CYSTITIS WITH HEMATURIA: ICD-10-CM

## 2021-01-15 DIAGNOSIS — R55 SYNCOPE, UNSPECIFIED SYNCOPE TYPE: Primary | ICD-10-CM

## 2021-01-15 DIAGNOSIS — I26.99 PULMONARY EMBOLISM WITHOUT ACUTE COR PULMONALE, UNSPECIFIED CHRONICITY, UNSPECIFIED PULMONARY EMBOLISM TYPE (HCC): ICD-10-CM

## 2021-01-15 LAB
ATRIAL RATE: 85 BPM
P AXIS: 80 DEGREES
PR INTERVAL: 140 MS
QRS AXIS: 76 DEGREES
QRSD INTERVAL: 74 MS
QT INTERVAL: 382 MS
QTC INTERVAL: 454 MS
SL AMB POCT HEMOGLOBIN AIC: 5.4 (ref ?–6.5)
T WAVE AXIS: 74 DEGREES
VENTRICULAR RATE: 85 BPM

## 2021-01-15 PROCEDURE — 83036 HEMOGLOBIN GLYCOSYLATED A1C: CPT | Performed by: PHYSICIAN ASSISTANT

## 2021-01-15 PROCEDURE — 99214 OFFICE O/P EST MOD 30 MIN: CPT | Performed by: PHYSICIAN ASSISTANT

## 2021-01-15 PROCEDURE — 93010 ELECTROCARDIOGRAM REPORT: CPT | Performed by: INTERNAL MEDICINE

## 2021-01-15 RX ORDER — AMOXICILLIN AND CLAVULANATE POTASSIUM 875; 125 MG/1; MG/1
1 TABLET, FILM COATED ORAL EVERY 12 HOURS SCHEDULED
Qty: 10 TABLET | Refills: 0 | Status: SHIPPED | OUTPATIENT
Start: 2021-01-15 | End: 2021-01-15 | Stop reason: SDUPTHER

## 2021-01-15 RX ORDER — ONDANSETRON 4 MG/1
4 TABLET, FILM COATED ORAL EVERY 8 HOURS PRN
Qty: 20 TABLET | Refills: 0 | Status: SHIPPED | OUTPATIENT
Start: 2021-01-15

## 2021-01-15 RX ORDER — AMOXICILLIN AND CLAVULANATE POTASSIUM 875; 125 MG/1; MG/1
1 TABLET, FILM COATED ORAL EVERY 12 HOURS SCHEDULED
Qty: 10 TABLET | Refills: 0 | Status: SHIPPED | OUTPATIENT
Start: 2021-01-15 | End: 2021-01-20

## 2021-01-15 RX ORDER — BENZONATATE 100 MG/1
100 CAPSULE ORAL 3 TIMES DAILY PRN
Qty: 20 CAPSULE | Refills: 0 | Status: SHIPPED | OUTPATIENT
Start: 2021-01-15

## 2021-01-15 NOTE — PROGRESS NOTES
Assessment/Plan:       Problem List Items Addressed This Visit        Cardiovascular and Mediastinum    Pulmonary embolism ([address] )    Relevant Orders    CTA chest pe study    VAS lower limb arterial duplex, complete bilateral      Other Visit Diagnoses     Syncope, unspecified syncope type    -  Primary    Relevant Orders    Comprehensive metabolic panel    CBC and differential    MRI brain w wo contrast    Ambulatory referral to Neurology    Elevated glucose        Relevant Orders    POCT hemoglobin A1c (Completed)    Comprehensive metabolic panel    Seizure-like activity (HCC)        Relevant Orders    CBC and differential    MRI brain w wo contrast    Ambulatory referral to Neurology    Acute cystitis with hematuria        Relevant Medications    amoxicillin-clavulanate (AUGMENTIN) 875-125 mg per tablet    Other Relevant Orders    Comprehensive metabolic panel    CBC and differential    UA (URINE) with reflex to Scope    Bronchitis        Relevant Medications    amoxicillin-clavulanate (AUGMENTIN) 875-125 mg per tablet    benzonatate (TESSALON PERLES) 100 mg capsule    Moderate persistent asthma with exacerbation        Nausea        Relevant Medications    ondansetron (ZOFRAN) 4 mg tablet        the etiology of events yesterday is not immediately clear  She may have had a vasovagal episode after severe coughing fit, however, LOC with likely convulsions and what seems to be a post-ictal period of confusion warrants and neurology referral  She did have a slight lactic elevation and drastically labile blood glucose  Will also order MRI of the brain at this time  Her neurologic exam today is normal   She is very tight on exam with diffuse rhonchi  Given this, will start treatment for asthma exacerbation and cover for bacterial lower respiratory infection given week of sx  augmentin will also cover for probably UTI given her urine findings in the ED    Pt was lost to follow up for PE, encouraged compliance with eliquis, follow up with heme, and follow up CTA PE study/duplex US BLE as previously ordered  Red flag signs and ED precautions discussed  Update labs tomorrow  Follow up in 1 week    Subjective:      Patient ID: Woodrow Real is a 26 y o  female  Pt resents for ED follow up  Yesterday she shares she was standing at her sink doing dishes and passed out  Her kids called the pts mother and said she was turning blue and not responding  Per the mother when she arrived her lips were blue and her entire body was "twitching"  EMS transported to ED  Workup in the ED showed normal head CT, elevated white count, elevated lactic acid, elevated glucose  She shares she was told she may be diabetic  She has a hx of PE but was lost to follow up with heme  She shares for the past week she has been experiencing cough, wheezing, abdominal discomfort, "choking" on phlegm, fatigue and nausea  She shares she has been so nauseas that she cannot take any medicine, including her thinners, for the past week  She has no seizure hx  She continues to cough, feel tight, and feel nauseas  She has had a headache since the ED  She notes increased thirst and urination, however her a1c is 5 4 in the office today  She has no weakness, numbness, confusion, lightheadedness  She was covid negative in the ED  She dnies use of drugs or abuse of alcohol       The following portions of the patient's history were reviewed and updated as appropriate:   She  has a past medical history of Allergic, Anxiety, Asthma, Migraine, and Pneumonia    She   Patient Active Problem List    Diagnosis Date Noted    Breast lump on right side at 11 o'clock position 07/20/2020    Family history of breast cancer 07/20/2020    JOSH (generalized anxiety disorder) 06/16/2020    Pulmonary embolism ([address] ) 06/24/2019    Current use of anticoagulant therapy 06/24/2019    Sinus congestion 06/24/2019    Seasonal allergies 06/24/2019    Left elbow pain 02/14/2019    Oral thrush 2018    Recurrent sinusitis 2018    Need for influenza vaccination 10/31/2018    Need for Tdap vaccination 10/31/2018    Cellulitis and abscess of buttock 2018    URI with cough and congestion 2018    Encounter to establish care 2018    Mild intermittent asthma without complication 15/81/9920    Scabies exposure 2018    Migraine with aura and without status migrainosus, not intractable 2018    Moderate persistent asthma without complication     Rheumatoid arthritis involving both knees ([facility] [address] ) 2018    Arthralgia of both knees 2018    Need for lipid screening 2018    Non-seasonal allergic rhinitis due to pollen 2018    Cigarette nicotine dependence without complication      She  has a past surgical history that includes  section and Femur fracture surgery  Her family history includes Asthma in her daughter and son; Diabetes in her mother; Hypertension in her father  She  reports that she has been smoking cigarettes  She has been smoking about 0 25 packs per day  She has never used smokeless tobacco  She reports that she does not drink alcohol or use drugs    Current Outpatient Medications   Medication Sig Dispense Refill    albuterol (PROVENTIL HFA,VENTOLIN HFA) 90 mcg/act inhaler INHALE 2 PUFFS EVERY 6 HOURS AS NEEDED FOR WHEEZING 18 g 0    albuterol (PROVENTIL HFA,VENTOLIN HFA) 90 mcg/act inhaler Inhale 2 puffs every 4 (four) hours as needed for wheezing 1 Inhaler 0    amoxicillin-clavulanate (AUGMENTIN) 875-125 mg per tablet Take 1 tablet by mouth every 12 (twelve) hours for 5 days 10 tablet 0    benzonatate (TESSALON PERLES) 100 mg capsule Take 1 capsule (100 mg total) by mouth 3 (three) times a day as needed for cough 20 capsule 0    busPIRone (BUSPAR) 5 mg tablet Take 1 tablet (5 mg total) by mouth 2 (two) times a day 180 tablet 3    ELIQUIS 5 MG TAKE 1 TABLET (5 MG TOTAL) BY MOUTH 2 (TWO) TIMES A DAY 60 tablet 0    FLOVENT  MCG/ACT inhaler INHALE 1 PUFF 2 (TWO) TIMES A DAY 1 Inhaler 1    gabapentin (NEURONTIN) 100 mg capsule TAKE 1 CAPSULE (100 MG TOTAL) BY MOUTH 2 (TWO) TIMES A DAY 60 capsule 2    ondansetron (ZOFRAN) 4 mg tablet Take 1 tablet (4 mg total) by mouth every 8 (eight) hours as needed for nausea or vomiting 20 tablet 0    predniSONE 20 mg tablet Take 2 tablets (40 mg total) by mouth daily for 5 days 10 tablet 0     No current facility-administered medications for this visit  Current Outpatient Medications on File Prior to Visit   Medication Sig    albuterol (PROVENTIL HFA,VENTOLIN HFA) 90 mcg/act inhaler INHALE 2 PUFFS EVERY 6 HOURS AS NEEDED FOR WHEEZING    albuterol (PROVENTIL HFA,VENTOLIN HFA) 90 mcg/act inhaler Inhale 2 puffs every 4 (four) hours as needed for wheezing    busPIRone (BUSPAR) 5 mg tablet Take 1 tablet (5 mg total) by mouth 2 (two) times a day    ELIQUIS 5 MG TAKE 1 TABLET (5 MG TOTAL) BY MOUTH 2 (TWO) TIMES A DAY    FLOVENT  MCG/ACT inhaler INHALE 1 PUFF 2 (TWO) TIMES A DAY    gabapentin (NEURONTIN) 100 mg capsule TAKE 1 CAPSULE (100 MG TOTAL) BY MOUTH 2 (TWO) TIMES A DAY    predniSONE 20 mg tablet Take 2 tablets (40 mg total) by mouth daily for 5 days     Current Facility-Administered Medications on File Prior to Visit   Medication    [COMPLETED] ondansetron (ZOFRAN) injection 4 mg    [COMPLETED] sodium chloride 0 9 % bolus 1,000 mL    [DISCONTINUED] sodium chloride (PF) 0 9 % injection 3 mL    [DISCONTINUED] sodium chloride 0 9 % bolus 1,000 mL     She is allergic to diphenhydramine; bee venom; ciprofloxacin; doxycycline; and sulfasalazine       Review of Systems   Constitutional: Positive for fatigue  Negative for chills and fever  HENT: Positive for congestion  Negative for ear pain, hearing loss, nosebleeds, postnasal drip, rhinorrhea, sinus pressure, sinus pain, sneezing and sore throat      Eyes: Negative for pain, discharge, itching and visual disturbance  Respiratory: Positive for cough  Negative for chest tightness, shortness of breath and wheezing  Cardiovascular: Negative for chest pain, palpitations and leg swelling  Gastrointestinal: Positive for nausea  Negative for abdominal pain, blood in stool, constipation, diarrhea and vomiting  Endocrine: Positive for polydipsia and polyuria  Genitourinary: Positive for frequency  Negative for dysuria, hematuria and urgency  Neurological: Positive for syncope and headaches  Negative for dizziness, light-headedness and numbness  Objective:      /87   Pulse 94   Temp 97 9 °F (36 6 °C)   Wt 91 2 kg (201 lb)   SpO2 97%   BMI 32 44 kg/m²          Physical Exam  Vitals signs and nursing note reviewed  Constitutional:       General: She is not in acute distress  Appearance: Normal appearance  HENT:      Head: Normocephalic and atraumatic  Nose: Nose normal       Mouth/Throat:      Mouth: Mucous membranes are moist       Pharynx: Oropharynx is clear  No oropharyngeal exudate or posterior oropharyngeal erythema  Eyes:      Pupils: Pupils are equal, round, and reactive to light  Neck:      Musculoskeletal: Normal range of motion and neck supple  Cardiovascular:      Rate and Rhythm: Normal rate and regular rhythm  Heart sounds: Normal heart sounds  Pulmonary:      Effort: Pulmonary effort is normal  No respiratory distress  Breath sounds: Normal breath sounds  Abdominal:      General: Bowel sounds are normal       Palpations: Abdomen is soft  Musculoskeletal: Normal range of motion  Skin:     General: Skin is warm and dry  Coloration: Skin is not pale  Findings: No erythema or rash  Neurological:      General: No focal deficit present  Mental Status: She is alert and oriented to person, place, and time  Mental status is at baseline  Cranial Nerves: No cranial nerve deficit  Motor: No weakness        Gait: Gait normal    Psychiatric:         Mood and Affect: Mood and affect normal

## 2021-01-20 DIAGNOSIS — F41.1 GAD (GENERALIZED ANXIETY DISORDER): ICD-10-CM

## 2021-01-20 LAB
BACTERIA BLD CULT: NORMAL
BACTERIA BLD CULT: NORMAL

## 2021-01-20 RX ORDER — GABAPENTIN 100 MG/1
100 CAPSULE ORAL 2 TIMES DAILY
Qty: 60 CAPSULE | Refills: 2 | Status: SHIPPED | OUTPATIENT
Start: 2021-01-20 | End: 2021-02-19

## 2021-01-20 NOTE — ED PROVIDER NOTES
History  Chief Complaint   Patient presents with    Headache     headache and cough x days, today family reports syncopal episode with seizure activity        23-year-old female presenting to the emergency department for evaluation of headache  Patient has had headache and cough for several days  Family reports that she had a syncopal episode With some shaking afterwards     Apparently there are several other people in the house who have also had headaches  Patient states that she was having episode of post tussive emesis and then synopsized after a coughing fit  Currently she has some mild shortness of breath  Prior to Admission Medications   Prescriptions Last Dose Informant Patient Reported? Taking? ELIQUIS 5 MG   No No   Sig: TAKE 1 TABLET (5 MG TOTAL) BY MOUTH 2 (TWO) TIMES A DAY   FLOVENT  MCG/ACT inhaler   No No   Sig: INHALE 1 PUFF 2 (TWO) TIMES A DAY   albuterol (PROVENTIL HFA,VENTOLIN HFA) 90 mcg/act inhaler   No No   Sig: INHALE 2 PUFFS EVERY 6 HOURS AS NEEDED FOR WHEEZING   busPIRone (BUSPAR) 5 mg tablet   No No   Sig: Take 1 tablet (5 mg total) by mouth 2 (two) times a day   gabapentin (NEURONTIN) 100 mg capsule   No No   Sig: TAKE 1 CAPSULE (100 MG TOTAL) BY MOUTH 2 (TWO) TIMES A DAY      Facility-Administered Medications: None       Past Medical History:   Diagnosis Date    Allergic     Anxiety     Asthma     Migraine     Pneumonia        Past Surgical History:   Procedure Laterality Date     SECTION      FEMUR FRACTURE SURGERY         Family History   Problem Relation Age of Onset    Diabetes Mother     Hypertension Father     Asthma Son     Asthma Daughter      I have reviewed and agree with the history as documented      E-Cigarette/Vaping     E-Cigarette/Vaping Substances     Social History     Tobacco Use    Smoking status: Current Every Day Smoker     Packs/day: 0 25     Types: Cigarettes    Smokeless tobacco: Never Used   Substance Use Topics    Alcohol use: No    Drug use: No       Review of Systems   Constitutional: Negative for appetite change, chills, fatigue and fever  HENT: Negative for sneezing and sore throat  Eyes: Negative for visual disturbance  Respiratory: Positive for cough  Negative for choking, chest tightness, shortness of breath and wheezing  Cardiovascular: Negative for chest pain and palpitations  Gastrointestinal: Positive for nausea and vomiting  Negative for abdominal pain, constipation and diarrhea  Genitourinary: Negative for difficulty urinating and dysuria  Neurological: Positive for syncope and headaches  Negative for dizziness, weakness, light-headedness and numbness  All other systems reviewed and are negative  Physical Exam  Physical Exam  Vitals signs and nursing note reviewed  Constitutional:       General: She is not in acute distress  Appearance: She is well-developed  She is not diaphoretic  HENT:      Head: Normocephalic and atraumatic  Eyes:      Pupils: Pupils are equal, round, and reactive to light  Neck:      Vascular: No JVD  Trachea: No tracheal deviation  Cardiovascular:      Rate and Rhythm: Normal rate and regular rhythm  Heart sounds: Normal heart sounds  No murmur  No friction rub  No gallop  Pulmonary:      Effort: Pulmonary effort is normal  No respiratory distress  Breath sounds: Normal breath sounds  No wheezing or rales  Abdominal:      General: Bowel sounds are normal  There is no distension  Palpations: Abdomen is soft  Tenderness: There is no abdominal tenderness  There is no guarding or rebound  Skin:     General: Skin is warm and dry  Coloration: Skin is not pale  Neurological:      Mental Status: She is alert and oriented to person, place, and time  Cranial Nerves: No cranial nerve deficit  Motor: No abnormal muscle tone     Psychiatric:         Behavior: Behavior normal          Vital Signs  ED Triage Vitals Temperature Pulse Respirations Blood Pressure SpO2   01/14/21 1744 01/14/21 1743 01/14/21 1743 01/14/21 1743 01/14/21 1743   97 6 °F (36 4 °C) 91 18 126/78 99 %      Temp Source Heart Rate Source Patient Position - Orthostatic VS BP Location FiO2 (%)   01/14/21 1744 01/14/21 1743 01/14/21 1743 01/14/21 1743 --   Oral Monitor Lying Right arm       Pain Score       01/14/21 1743       7           Vitals:    01/14/21 1743 01/14/21 2015   BP: 126/78    Pulse: 91 94   Patient Position - Orthostatic VS: Lying          Visual Acuity  Visual Acuity      Most Recent Value   L Pupil Size (mm)  2   R Pupil Size (mm)  2          ED Medications  Medications   ondansetron (ZOFRAN) injection 4 mg (4 mg Intravenous Given 1/14/21 1908)   sodium chloride 0 9 % bolus 1,000 mL (0 mL Intravenous Stopped 1/14/21 2310)       Diagnostic Studies  Results Reviewed     Procedure Component Value Units Date/Time    Blood culture #1 [[id number]] Collected: 01/14/21 1825    Lab Status: Final result Specimen: Blood from Hand, Left Updated: 01/20/21 0001     Blood Culture No Growth After 5 Days  Blood culture #2 [[id number]] Collected: 01/14/21 1825    Lab Status: Final result Specimen: Blood from Hand, Left Updated: 01/20/21 0001     Blood Culture No Growth After 5 Days  Procalcitonin with AM Reflex [[id number]]  (Normal) Collected: 01/14/21 1926    Lab Status: Final result Specimen: Blood from Arm, Left Updated: 01/14/21 2317     Procalcitonin <1 01 ng/ml     Salicylate level [[id number]]  (Abnormal) Collected: 01/14/21 1935    Lab Status: Final result Specimen: Blood from Arm, Left Updated: 34/94/71 0764     Salicylate Lvl <3 mg/dL     Acetaminophen level-If concentration is detectable, please discuss with medical  on call   [[id number]]  (Abnormal) Collected: 01/14/21 1935    Lab Status: Final result Specimen: Blood from Arm, Left Updated: 01/14/21 2020     Acetaminophen Level <2 0 ug/mL     Urine Microscopic [[id number]] (Abnormal) Collected: 01/14/21 1935    Lab Status: Final result Specimen: Urine, Clean Catch Updated: 01/14/21 2012     RBC, UA None Seen /hpf      WBC, UA 2-4 /hpf      Epithelial Cells Innumerable /hpf      Bacteria, UA Moderate /hpf     Rapid drug screen, urine [[id number]]  (Abnormal) Collected: 01/14/21 1935    Lab Status: Final result Specimen: Urine, Clean Catch Updated: 01/14/21 2007     Amph/Meth UR Negative     Barbiturate Ur Negative     Benzodiazepine Urine Negative     Cocaine Urine Negative     Methadone Urine Negative     Opiate Urine Negative     PCP Ur Negative     THC Urine Positive     Oxycodone Urine Negative    Narrative:      Presumptive report  If requested, specimen will be sent to reference lab for confirmation  FOR MEDICAL PURPOSES ONLY  IF CONFIRMATION NEEDED PLEASE CONTACT THE LAB WITHIN 5 DAYS      Drug Screen Cutoff Levels:  AMPHETAMINE/METHAMPHETAMINES  1000 ng/mL  BARBITURATES     200 ng/mL  BENZODIAZEPINES     200 ng/mL  COCAINE      300 ng/mL  METHADONE      300 ng/mL  OPIATES      300 ng/mL  PHENCYCLIDINE     25 ng/mL  THC       50 ng/mL  OXYCODONE      100 ng/mL    Ethanol [[id number]]  (Normal) Collected: 01/14/21 1935    Lab Status: Final result Specimen: Blood from Arm, Left Updated: 01/14/21 2006     Ethanol Lvl <3 mg/dL     UA w Reflex to Microscopic w Reflex to Culture [[id number]]  (Abnormal) Collected: 01/14/21 1935    Lab Status: Final result Specimen: Urine, Clean Catch Updated: 01/14/21 1956     Color, UA Yellow     Clarity, UA Cloudy     Specific [place], UA 1 020     pH, UA 7 0     Leukocytes, UA Negative     Nitrite, UA Negative     Protein, UA 30 (1+) mg/dl      Glucose,  (1/10%) mg/dl      Ketones, UA Negative mg/dl      Urobilinogen, UA 0 2 E U /dl      Bilirubin, UA Negative     Blood, UA Moderate    COVID19, Influenza A/B, RSV PCR, [facility] [[id number]]  (Normal) Collected: 01/14/21 1824    Lab Status: Final result Specimen: Nares from Nasopharyngeal Swab Updated: 01/14/21 1922     SARS-CoV-2 Negative     INFLUENZA A PCR Negative     INFLUENZA B PCR Negative     RSV PCR Negative    Narrative: This test has been authorized by FDA under an EUA (Emergency Use Assay) for use by authorized laboratories  Clinical caution and judgement should be used with the interpretation of these results with consideration of the clinical impression and other laboratory testing  Testing reported as "Positive" or "Negative" has been proven to be accurate according to standard laboratory validation requirements  All testing is performed with control materials showing appropriate reactivity at standard intervals  Protime-INR [[id number]]  (Normal) Collected: 01/14/21 1825    Lab Status: Final result Specimen: Blood from Hand, Left Updated: 01/14/21 1911     Protime 11 9 seconds      INR 0 92    APTT [[id number]]  (Abnormal) Collected: 01/14/21 1825    Lab Status: Final result Specimen: Blood from Hand, Left Updated: 01/14/21 1911     PTT 22 seconds     Lactic Acid [[id number]]  (Abnormal) Collected: 01/14/21 1825    Lab Status: Final result Specimen: Blood from Hand, Left Updated: 01/14/21 1907     LACTIC ACID 2 1 mmol/L     Narrative:      Result may be elevated if tourniquet was used during collection      Comprehensive metabolic panel [[id number]]  (Abnormal) Collected: 01/14/21 1825    Lab Status: Final result Specimen: Blood from Hand, Left Updated: 01/14/21 1858     Sodium 139 mmol/L      Potassium 3 7 mmol/L      Chloride 104 mmol/L      CO2 24 mmol/L      ANION GAP 11 mmol/L      BUN 10 mg/dL      Creatinine 1 03 mg/dL      Glucose 268 mg/dL      Calcium 8 5 mg/dL      AST 20 U/L      ALT 52 U/L      Alkaline Phosphatase 60 U/L      Total Protein 7 7 g/dL      Albumin 3 8 g/dL      Total Bilirubin 0 20 mg/dL      eGFR 72 ml/min/1 73sq m     Narrative:      [place] guidelines for Chronic Kidney Disease (CKD):     Stage 1 with normal or high GFR (GFR > 90 mL/min/1 73 square meters)    Stage 2 Mild CKD (GFR = 60-89 mL/min/1 73 square meters)    Stage 3A Moderate CKD (GFR = 45-59 mL/min/1 73 square meters)    Stage 3B Moderate CKD (GFR = 30-44 mL/min/1 73 square meters)    Stage 4 Severe CKD (GFR = 15-29 mL/min/1 73 square meters)    Stage 5 End Stage CKD (GFR <15 mL/min/1 73 square meters)  Note: GFR calculation is accurate only with a steady state creatinine    Carboxyhemoglobin [[id number]]  (Abnormal) Collected: 01/14/21 1825    Lab Status: Final result Specimen: Blood from Hand, Left Updated: 01/14/21 1858     Carbon Monoxide, Blood 2 5 %     Narrative:       Therapeutic levels (1 mg/mL and 2 mg/mL) of hydroxocobalamin may interfere with the fCOHb and fMetHb where it may cause lower than expected values  Normal Carboxyhemoglobin range for nonsmokers is <1 5%   Normal Carboxyhemoglobin range for smokers is 1 5% to 5 1%     Blood gas, venous [[id number]]  (Abnormal) Collected: 01/14/21 1825    Lab Status: Final result Specimen: Blood from Hand, Left Updated: 01/14/21 1847     pH, Baldev 7 291     pCO2, Baldev 44 0 mm Hg      pO2, Baldev 68 6 mm Hg      HCO3, Baldev 20 7 mmol/L      Base Excess, Baldev -5 7 mmol/L      O2 Content, Baldev 18 4 ml/dL      O2 HGB, VENOUS 89 0 %     Beta Hydroxybutyrate [[id number]]  (Normal) Collected: 01/14/21 1825    Lab Status: Final result Specimen: Blood from Hand, Left Updated: 01/14/21 1844     BETA-HYDROXYBUTYRATE 0 1 mmol/L     CBC and differential [[id number]]  (Abnormal) Collected: 01/14/21 1825    Lab Status: Final result Specimen: Blood from Hand, Left Updated: 01/14/21 1842     WBC 14 11 Thousand/uL      RBC 4 51 Million/uL      Hemoglobin 13 9 g/dL      Hematocrit 43 7 %      MCV 97 fL      MCH 30 8 pg      MCHC 31 8 g/dL      RDW 12 2 %      MPV 9 0 fL      Platelets 819 Thousands/uL      nRBC 0 /100 WBCs      Neutrophils Relative 81 %      Immat GRANS % 1 %      Lymphocytes Relative 13 %      Monocytes Relative 5 %      Eosinophils Relative 0 %      Basophils Relative 0 %      Neutrophils Absolute 11 33 Thousands/µL      Immature Grans Absolute 0 11 Thousand/uL      Lymphocytes Absolute 1 85 Thousands/µL      Monocytes Absolute 0 74 Thousand/µL      Eosinophils Absolute 0 03 Thousand/µL      Basophils Absolute 0 05 Thousands/µL     Fingerstick Glucose (POCT) [[id number]]  (Abnormal) Collected: 01/14/21 1750    Lab Status: Final result Updated: 01/14/21 1752     POC Glucose 243 mg/dl                  CT head without contrast   Final Result by Supriya Agrawal MD (01/14 2053)      No mass effect, acute intracranial hemorrhage or evidence of recent infarction  Workstation performed: [id number]         XR chest 1 view portable   Final Result by Seth Rodriguez MD (01/15 3590)      No acute cardiopulmonary disease  Workstation performed: [id number]                    Procedures  Procedures         ED Course                                           MDM  Number of Diagnoses or Management Options  Cough:   Syncopal episodes:   Diagnosis management comments:   49-year-old female with cough syncopal episodes, unclear there is 2 seizure activity or some myoclonic jerking, now she is at baseline, will check for pulmonary infection, will check for carbon monoxide poisoning, give steroids, reassess  Patient is feeling better, requesting to be discharged  Disposition  Final diagnoses:   Cough   Syncopal episodes     Time reflects when diagnosis was documented in both MDM as applicable and the Disposition within this note     Time User Action Codes Description Comment    1/14/2021  9:57 PM David Davidson Add [R05] Cough     1/14/2021  9:57 PM Jaelyn Davidson Add [R55] Syncopal episodes       ED Disposition     ED Disposition Condition Date/Time Comment    Discharge Stable Thu Jan 14, 2021  9:57 PM [address] discharge to home/self care              Follow-up Information    None         Discharge Medication List as of 1/14/2021  9:59 PM      START taking these medications    Details   !! albuterol (PROVENTIL HFA,VENTOLIN HFA) 90 mcg/act inhaler Inhale 2 puffs every 4 (four) hours as needed for wheezing, Starting Thu 1/14/2021, Normal      predniSONE 20 mg tablet Take 2 tablets (40 mg total) by mouth daily for 5 days, Starting Thu 1/14/2021, Until Tue 1/19/2021, Normal       !! - Potential duplicate medications found  Please discuss with provider  CONTINUE these medications which have NOT CHANGED    Details   !! albuterol (PROVENTIL HFA,VENTOLIN HFA) 90 mcg/act inhaler INHALE 2 PUFFS EVERY 6 HOURS AS NEEDED FOR WHEEZING, Normal      busPIRone (BUSPAR) 5 mg tablet Take 1 tablet (5 mg total) by mouth 2 (two) times a day, Starting Tue 6/16/2020, Normal      ELIQUIS 5 MG TAKE 1 TABLET (5 MG TOTAL) BY MOUTH 2 (TWO) TIMES A DAY, Normal      FLOVENT  MCG/ACT inhaler INHALE 1 PUFF 2 (TWO) TIMES A DAY, Starting Tue 12/3/2019, Normal      gabapentin (NEURONTIN) 100 mg capsule TAKE 1 CAPSULE (100 MG TOTAL) BY MOUTH 2 (TWO) TIMES A DAY, Starting Mon 10/19/2020, Until Wed 11/18/2020, Normal       !! - Potential duplicate medications found  Please discuss with provider  No discharge procedures on file      PDMP Review     None          ED Provider  Electronically Signed by           Melanie Guerra MD  01/20/21 6014

## 2021-01-25 ENCOUNTER — TELEPHONE (OUTPATIENT)
Dept: ADMINISTRATIVE | Facility: OTHER | Age: 31
End: 2021-01-25

## 2021-01-25 NOTE — TELEPHONE ENCOUNTER
Upon review of the In Basket request we were able to locate, review, and update the patient chart as requested for HIV  Any additional questions or concerns should be emailed to the Practice Liaisons via [email or web address] email, please do not reply via In Basket      Thank you  Adalgisa Yuan MA

## 2021-01-25 NOTE — TELEPHONE ENCOUNTER
----- Message from Landon Soulier, MA sent at 1/21/2021  3:25 PM EST -----  Regarding: HIV  01/21/21 3:26 PM    Hello, our patient Samira Vargas has had HIV completed/performed  Please assist in updating the patient chart by pulling the Care Everywhere (CE) document  The date of service is 1       Thank you,  Landon Soulier, MA  [facility]

## 2022-03-23 ENCOUNTER — OFFICE VISIT (OUTPATIENT)
Dept: FAMILY MEDICINE CLINIC | Facility: CLINIC | Age: 32
End: 2022-03-23
Payer: COMMERCIAL

## 2022-03-23 VITALS
TEMPERATURE: 97.4 F | SYSTOLIC BLOOD PRESSURE: 150 MMHG | WEIGHT: 197 LBS | HEIGHT: 66 IN | BODY MASS INDEX: 31.66 KG/M2 | HEART RATE: 117 BPM | DIASTOLIC BLOOD PRESSURE: 90 MMHG | OXYGEN SATURATION: 97 %

## 2022-03-23 DIAGNOSIS — R55 SYNCOPE, UNSPECIFIED SYNCOPE TYPE: Primary | ICD-10-CM

## 2022-03-23 DIAGNOSIS — F41.8 DEPRESSION WITH ANXIETY: ICD-10-CM

## 2022-03-23 DIAGNOSIS — F22 PARANOID STATE (HCC): ICD-10-CM

## 2022-03-23 DIAGNOSIS — I27.82 CHRONIC PULMONARY EMBOLISM, UNSPECIFIED PULMONARY EMBOLISM TYPE, UNSPECIFIED WHETHER ACUTE COR PULMONALE PRESENT (HCC): ICD-10-CM

## 2022-03-23 DIAGNOSIS — R56.9 SEIZURE-LIKE ACTIVITY (HCC): ICD-10-CM

## 2022-03-23 PROCEDURE — 99214 OFFICE O/P EST MOD 30 MIN: CPT | Performed by: PHYSICIAN ASSISTANT

## 2022-03-23 RX ORDER — BUSPIRONE HYDROCHLORIDE 5 MG/1
5 TABLET ORAL 2 TIMES DAILY
Qty: 60 TABLET | Refills: 5 | Status: SHIPPED | OUTPATIENT
Start: 2022-03-23 | End: 2022-08-02

## 2022-03-23 NOTE — PROGRESS NOTES
Assessment/Plan:    No problem-specific Assessment & Plan notes found for this encounter  Diagnoses and all orders for this visit:    Syncope, unspecified syncope type  -     Ambulatory Referral to Neurology; Future  -     CBC and differential; Future  -     Comprehensive metabolic panel; Future  -     TSH, 3rd generation; Future  -     UA (URINE) with reflex to Scope  -     Vitamin B12; Future  -     APTT; Future    Seizure-like activity [facility])  -     Ambulatory Referral to Neurology; Future  -     CBC and differential; Future  -     Comprehensive metabolic panel; Future  -     TSH, 3rd generation; Future  -     UA (URINE) with reflex to Scope  -     Vitamin B12; Future    [facility] )  -     TSH, 3rd generation; Future  -     UA (URINE) with reflex to Scope  -     Vitamin B12; Future    Chronic pulmonary embolism, unspecified pulmonary embolism type, unspecified whether acute cor pulmonale present ([facility] )  -     Protime-INR; Future  -     APTT; Future    Depression with anxiety  -     busPIRone (BUSPAR) 5 mg tablet; Take 1 tablet (5 mg total) by mouth 2 (two) times a day        Discussed extensively the importance of follow up  Pt has pending MRI, CTA chest, bilateral lower extremity ultrasound     Update labs    Buspar initiation today with 1 month f/u       Subjective:      Patient ID: Willia Duverney is a 33 y o  female  Pt presents for evaluation of paranoia and anxiety  Has been experiencing worsening anxiety for the past year  She is having difficulty concentrating and functioning through her day  She has hx PE  Has not been taking eliquis  Had CTA chest ordered a year ago has not completed  Patient had vasovagal episode and seizure like activity in the ER about a year ago  Did not follow up with MRI or neurology   States she has not had any episodes like this since  She reports caffeine use/caffeine pills, coffee, hydroxycut pills  Denies drugs or ETOH        The following portions of the patient's history were reviewed and updated as appropriate: allergies, current medications, past family history, past medical history, past surgical history and problem list     Review of Systems   Constitutional: Negative for chills, fatigue and fever  HENT: Negative for congestion, ear pain, sinus pain, sore throat and trouble swallowing  Eyes: Negative for pain, discharge and redness  Respiratory: Negative for cough, chest tightness, shortness of breath and wheezing  Cardiovascular: Negative for chest pain, palpitations and leg swelling  Gastrointestinal: Negative for abdominal pain, diarrhea, nausea and vomiting  Musculoskeletal: Negative for arthralgias, joint swelling and myalgias  Skin: Negative for rash  Neurological: Negative for dizziness, weakness, numbness and headaches  Psychiatric/Behavioral: Negative for dysphoric mood  The patient is nervous/anxious  Objective:      /90 (BP Location: Left arm, Patient Position: Sitting, Cuff Size: Large)   Pulse (!) 117   Temp (!) 97 4 °F (36 3 °C)   Ht 5' 6" (1 676 m)   Wt 89 4 kg (197 lb)   SpO2 97%   BMI 31 80 kg/m²          Physical Exam  Vitals and nursing note reviewed  Constitutional:       General: She is not in acute distress  Appearance: Normal appearance  She is well-developed  Cardiovascular:      Rate and Rhythm: Normal rate and regular rhythm  Pulmonary:      Effort: Pulmonary effort is normal       Breath sounds: Normal breath sounds  Abdominal:      General: Bowel sounds are normal       Palpations: Abdomen is soft  Abdomen is not rigid  Tenderness: There is no abdominal tenderness  There is no guarding or rebound  Negative signs include Martin's sign and McBurney's sign  Hernia: No hernia is present  Skin:     General: Skin is warm and dry  Psychiatric:         Attention and Perception: Attention normal          Mood and Affect: Mood is anxious           Speech: Speech normal  Behavior: Behavior normal          Thought Content:  Thought content normal

## 2022-07-05 ENCOUNTER — VBI (OUTPATIENT)
Dept: ADMINISTRATIVE | Facility: OTHER | Age: 32
End: 2022-07-05

## 2022-08-02 ENCOUNTER — OFFICE VISIT (OUTPATIENT)
Dept: FAMILY MEDICINE CLINIC | Facility: CLINIC | Age: 32
End: 2022-08-02
Payer: COMMERCIAL

## 2022-08-02 VITALS
SYSTOLIC BLOOD PRESSURE: 138 MMHG | WEIGHT: 199 LBS | BODY MASS INDEX: 31.98 KG/M2 | HEART RATE: 91 BPM | OXYGEN SATURATION: 97 % | TEMPERATURE: 98.4 F | HEIGHT: 66 IN | DIASTOLIC BLOOD PRESSURE: 84 MMHG

## 2022-08-02 DIAGNOSIS — M25.562 ARTHRALGIA OF BOTH KNEES: ICD-10-CM

## 2022-08-02 DIAGNOSIS — M25.561 ARTHRALGIA OF BOTH KNEES: ICD-10-CM

## 2022-08-02 DIAGNOSIS — F41.1 GAD (GENERALIZED ANXIETY DISORDER): Primary | ICD-10-CM

## 2022-08-02 PROCEDURE — 99214 OFFICE O/P EST MOD 30 MIN: CPT | Performed by: PHYSICIAN ASSISTANT

## 2022-08-02 RX ORDER — CETIRIZINE HYDROCHLORIDE 10 MG/1
10 TABLET ORAL DAILY
COMMUNITY
Start: 2022-07-27

## 2022-08-02 RX ORDER — MELOXICAM 15 MG/1
15 TABLET ORAL DAILY
Qty: 30 TABLET | Refills: 2 | Status: SHIPPED | OUTPATIENT
Start: 2022-08-02

## 2022-08-02 RX ORDER — BUSPIRONE HYDROCHLORIDE 15 MG/1
15 TABLET ORAL 2 TIMES DAILY
Qty: 90 TABLET | Refills: 1 | Status: SHIPPED | OUTPATIENT
Start: 2022-08-02

## 2022-11-08 DIAGNOSIS — M25.561 ARTHRALGIA OF BOTH KNEES: ICD-10-CM

## 2022-11-08 DIAGNOSIS — M25.562 ARTHRALGIA OF BOTH KNEES: ICD-10-CM

## 2022-11-08 DIAGNOSIS — F41.1 GAD (GENERALIZED ANXIETY DISORDER): ICD-10-CM

## 2022-11-10 RX ORDER — MELOXICAM 15 MG/1
TABLET ORAL
Qty: 30 TABLET | Refills: 2 | Status: SHIPPED | OUTPATIENT
Start: 2022-11-10 | End: 2022-11-11 | Stop reason: SDUPTHER

## 2022-11-10 RX ORDER — BUSPIRONE HYDROCHLORIDE 15 MG/1
TABLET ORAL
Qty: 60 TABLET | Refills: 1 | Status: SHIPPED | OUTPATIENT
Start: 2022-11-10 | End: 2022-11-11 | Stop reason: SDUPTHER

## 2022-11-11 DIAGNOSIS — F41.1 GAD (GENERALIZED ANXIETY DISORDER): ICD-10-CM

## 2022-11-11 DIAGNOSIS — M25.561 ARTHRALGIA OF BOTH KNEES: ICD-10-CM

## 2022-11-11 DIAGNOSIS — M25.562 ARTHRALGIA OF BOTH KNEES: ICD-10-CM

## 2022-11-11 RX ORDER — BUSPIRONE HYDROCHLORIDE 15 MG/1
15 TABLET ORAL 2 TIMES DAILY
Qty: 60 TABLET | Refills: 1 | Status: SHIPPED | OUTPATIENT
Start: 2022-11-11

## 2022-11-11 RX ORDER — MELOXICAM 15 MG/1
15 TABLET ORAL DAILY
Qty: 30 TABLET | Refills: 2 | Status: SHIPPED | OUTPATIENT
Start: 2022-11-11

## 2022-12-29 ENCOUNTER — NURSE TRIAGE (OUTPATIENT)
Dept: OTHER | Facility: OTHER | Age: 32
End: 2022-12-29

## 2022-12-29 NOTE — TELEPHONE ENCOUNTER
Regarding: Sore throat/ Congestion/ Sick visit appt  ----- Message from Leidy Rojo sent at 12/29/2022  6:33 PM EST -----  "I have a really bad sore throat and chest/nasal congestion   I'm trying to see if I can schedule an appt to be seen tomorrow"

## 2023-02-09 ENCOUNTER — VBI (OUTPATIENT)
Dept: ADMINISTRATIVE | Facility: OTHER | Age: 33
End: 2023-02-09

## 2023-03-23 DIAGNOSIS — F41.1 GAD (GENERALIZED ANXIETY DISORDER): ICD-10-CM

## 2023-03-24 DIAGNOSIS — F41.1 GAD (GENERALIZED ANXIETY DISORDER): ICD-10-CM

## 2023-03-24 RX ORDER — BUSPIRONE HYDROCHLORIDE 15 MG/1
15 TABLET ORAL 2 TIMES DAILY
Qty: 60 TABLET | Refills: 0 | Status: SHIPPED | OUTPATIENT
Start: 2023-03-24 | End: 2023-04-23

## 2023-03-24 RX ORDER — BUSPIRONE HYDROCHLORIDE 15 MG/1
TABLET ORAL
Qty: 60 TABLET | Refills: 1 | Status: SHIPPED | OUTPATIENT
Start: 2023-03-24

## 2023-03-27 NOTE — TELEPHONE ENCOUNTER
Left message to call the office to set up appt since with a new provider since Pablo Main is no longer here  rb

## 2023-05-06 DIAGNOSIS — M25.561 ARTHRALGIA OF BOTH KNEES: ICD-10-CM

## 2023-05-06 DIAGNOSIS — M25.562 ARTHRALGIA OF BOTH KNEES: ICD-10-CM

## 2023-05-08 RX ORDER — MELOXICAM 15 MG/1
TABLET ORAL
Qty: 30 TABLET | Refills: 2 | Status: SHIPPED | OUTPATIENT
Start: 2023-05-08

## 2023-08-12 DIAGNOSIS — F41.1 GAD (GENERALIZED ANXIETY DISORDER): ICD-10-CM

## 2023-08-14 RX ORDER — BUSPIRONE HYDROCHLORIDE 15 MG/1
TABLET ORAL
Qty: 60 TABLET | Refills: 1 | Status: SHIPPED | OUTPATIENT
Start: 2023-08-14

## 2023-09-22 DIAGNOSIS — M25.562 ARTHRALGIA OF BOTH KNEES: ICD-10-CM

## 2023-09-22 DIAGNOSIS — M25.561 ARTHRALGIA OF BOTH KNEES: ICD-10-CM

## 2023-09-24 RX ORDER — MELOXICAM 15 MG/1
TABLET ORAL
Qty: 30 TABLET | Refills: 2 | Status: SHIPPED | OUTPATIENT
Start: 2023-09-24

## 2023-11-02 DIAGNOSIS — F41.1 GAD (GENERALIZED ANXIETY DISORDER): ICD-10-CM

## 2023-11-02 NOTE — TELEPHONE ENCOUNTER
Pt made appt for Nov 27 and is asking for a short fill of medication until she can come into the office

## 2023-11-03 RX ORDER — BUSPIRONE HYDROCHLORIDE 15 MG/1
15 TABLET ORAL 2 TIMES DAILY
Qty: 60 TABLET | Refills: 1 | Status: SHIPPED | OUTPATIENT
Start: 2023-11-03

## 2023-11-24 PROBLEM — Z76.89 ENCOUNTER TO ESTABLISH CARE: Status: RESOLVED | Noted: 2018-06-04 | Resolved: 2023-11-24

## 2023-11-24 PROBLEM — Z13.220 NEED FOR LIPID SCREENING: Status: RESOLVED | Noted: 2018-06-04 | Resolved: 2023-11-24

## 2023-11-24 PROBLEM — Z20.7 SCABIES EXPOSURE: Status: RESOLVED | Noted: 2018-06-04 | Resolved: 2023-11-24

## 2023-11-24 PROBLEM — J32.9 RECURRENT SINUSITIS: Status: RESOLVED | Noted: 2018-11-13 | Resolved: 2023-11-24

## 2023-11-24 PROBLEM — Z23 NEED FOR TDAP VACCINATION: Status: RESOLVED | Noted: 2018-10-31 | Resolved: 2023-11-24

## 2023-11-24 PROBLEM — Z23 NEED FOR INFLUENZA VACCINATION: Status: RESOLVED | Noted: 2018-10-31 | Resolved: 2023-11-24

## 2023-11-24 PROBLEM — Z79.01 CURRENT USE OF ANTICOAGULANT THERAPY: Status: RESOLVED | Noted: 2019-06-24 | Resolved: 2023-11-24

## 2023-11-24 PROBLEM — M25.522 LEFT ELBOW PAIN: Status: RESOLVED | Noted: 2019-02-14 | Resolved: 2023-11-24

## 2023-11-24 PROBLEM — R09.81 SINUS CONGESTION: Status: RESOLVED | Noted: 2019-06-24 | Resolved: 2023-11-24

## 2023-11-24 PROBLEM — J30.2 SEASONAL ALLERGIES: Status: RESOLVED | Noted: 2019-06-24 | Resolved: 2023-11-24

## 2023-11-24 PROBLEM — Z86.711 HISTORY OF PULMONARY EMBOLISM: Status: ACTIVE | Noted: 2019-06-24

## 2023-11-24 PROBLEM — J45.20 MILD INTERMITTENT ASTHMA WITHOUT COMPLICATION: Status: RESOLVED | Noted: 2018-06-04 | Resolved: 2023-11-24

## 2023-11-24 PROBLEM — B37.0 ORAL THRUSH: Status: RESOLVED | Noted: 2018-11-13 | Resolved: 2023-11-24

## 2023-11-24 PROBLEM — N63.11 BREAST LUMP ON RIGHT SIDE AT 11 O'CLOCK POSITION: Status: RESOLVED | Noted: 2020-07-20 | Resolved: 2023-11-24

## 2023-11-24 PROBLEM — L03.317 CELLULITIS AND ABSCESS OF BUTTOCK: Status: RESOLVED | Noted: 2018-09-24 | Resolved: 2023-11-24

## 2023-11-24 PROBLEM — J06.9 URI WITH COUGH AND CONGESTION: Status: RESOLVED | Noted: 2018-09-24 | Resolved: 2023-11-24

## 2023-11-24 PROBLEM — L02.31 CELLULITIS AND ABSCESS OF BUTTOCK: Status: RESOLVED | Noted: 2018-09-24 | Resolved: 2023-11-24

## 2023-11-24 PROBLEM — Z91.09 ENVIRONMENTAL ALLERGIES: Status: ACTIVE | Noted: 2018-06-04

## 2023-11-24 RX ORDER — NALOXONE HYDROCHLORIDE 4 MG/.1ML
SPRAY NASAL
COMMUNITY
Start: 2023-11-01 | End: 2023-11-27

## 2023-11-24 RX ORDER — BUPRENORPHINE AND NALOXONE 8; 2 MG/1; MG/1
FILM, SOLUBLE BUCCAL; SUBLINGUAL
COMMUNITY
Start: 2023-11-01

## 2023-11-24 NOTE — PROGRESS NOTES
Breann He 1990 female MRN: [id number]      ASSESSMENT/PLAN  Problem List Items Addressed This Visit        Respiratory    Mild intermittent asthma without complication    Relevant Medications    albuterol (PROVENTIL HFA,VENTOLIN HFA) 90 mcg/act inhaler       Cardiovascular and Mediastinum    Migraine with aura and without status migrainosus, not intractable    Relevant Medications    buprenorphine-naloxone (Suboxone) 8-2 mg    topiramate (Topamax) 50 MG tablet    meloxicam (MOBIC) 15 mg tablet       Musculoskeletal and Integument    Rheumatoid arthritis involving both knees (HCC)    Relevant Medications    meloxicam (MOBIC) 15 mg tablet       Other    JOSH (generalized anxiety disorder) - Primary    Relevant Medications    busPIRone (BUSPAR) 15 mg tablet    Other Relevant Orders    TSH, 3rd generation with Free T4 reflex    Environmental allergies    Relevant Orders    CBC and differential    Cigarette nicotine dependence without complication    Relevant Orders    CBC and differential   Other Visit Diagnoses     Healthcare maintenance        Screening for diabetes mellitus        Relevant Orders    Comprehensive metabolic panel    Screening, lipid        Relevant Orders    Lipid panel    Encounter for hepatitis C screening test for low risk patient        Relevant Orders    Hepatitis C antibody        Discussed alternative treatment options for migraines, including amitriptyline, which may help with headache, sleep, anxiety, and pain symptoms; however, pt would like to try Topamax again instead. Reviewed possible ADRs including dry mouth, GI upset, somnolence. Will f/u in 4 weeks to monitor. Is going to continue taking Melatonin for sleep. Doing well on Buspar, continue. Update TSH. Albuterol refill sent, update CBC.      Health Maintenance:   BP WNL   CMP + Lipids to screen for HLD, DM   Hep C screening ordered, pt agreeable   Pap DUE -- encouraged to schedule with Gyn   Vaccinations: TDap UTD, Flu deferred, COVID deferred  Encouraged regular physical activity, varied diet, and regular dental/eye exams         Future Appointments   Date Time Provider [address]   1/2/2024 11:00 AM DO EPI [facility]            SUBJECTIVE  CC: Medication Management (Patient states, she is always "hot" )      HPI:  Mauricio Velasco is a 26 y.o. female who presents for annual physical. History reviewed and updated as below. Has a few concerns as detailed below. Migraines -- almost every day, more severe episodes twice a month  In a band across the front, sometimes into her eyes, (+) photosensitivity, nausea, no numbness/tingling/weakness in extremities   Was previously on Topamax, but did not tolerate (hair loss)   Currently taking Tylenol, Ibuprofen with some relief     Does feel like her anxiety is well controlled with Buspar     Review of Systems   Constitutional:  Negative for unexpected weight change. HENT:  Positive for congestion and rhinorrhea. Negative for ear pain and sore throat. Eyes:  Positive for photophobia. Negative for visual disturbance. Respiratory:  Positive for cough. Negative for shortness of breath. Cardiovascular:  Negative for chest pain, palpitations and leg swelling. Gastrointestinal:  Negative for abdominal pain, constipation and diarrhea. Endocrine: Negative for polyuria. Genitourinary:  Negative for dysuria and menstrual problem. Neurological:  Positive for headaches. Negative for dizziness, weakness and numbness. Psychiatric/Behavioral:  Positive for sleep disturbance (takes melatonin, wakes up frequently throughout the night). The patient is nervous/anxious.         Historical Information   The patient history was reviewed and updated as follows:    Past Medical History:   Diagnosis Date   • Allergic    • Anxiety    • Asthma    • Cellulitis and abscess of buttock 09/24/2018   • Migraine    • Pneumonia      Past Surgical History:   Procedure Laterality Date   •  SECTION     • FEMUR FRACTURE SURGERY Bilateral      Family History   Problem Relation Age of Onset   • Diabetes Mother    • Hypertension Father    • Asthma Son    • Asthma Daughter       Social History   Social History     Substance and Sexual Activity   Alcohol Use Yes    Comment: 1-2 times per week     Social History     Substance and Sexual Activity   Drug Use Not Currently     Social History     Tobacco Use   Smoking Status Every Day   • Packs/day: 0.50   • Years: 19.00   • Total pack years: 9.50   • Types: Cigarettes   • Start date:    Smokeless Tobacco Never       Medications:     Current Outpatient Medications:   •  albuterol (PROVENTIL HFA,VENTOLIN HFA) 90 mcg/act inhaler, Inhale 2 puffs every 4 (four) hours as needed for wheezing or shortness of breath (cough, chest congestion), Disp: 18 g, Rfl: 1  •  buprenorphine-naloxone (Suboxone) 8-2 mg, , Disp: , Rfl:   •  busPIRone (BUSPAR) 15 mg tablet, Take 1 tablet (15 mg total) by mouth 2 (two) times a day, Disp: 180 tablet, Rfl: 1  •  meloxicam (MOBIC) 15 mg tablet, Take 1 tablet (15 mg total) by mouth daily as needed for moderate pain, Disp: 30 tablet, Rfl: 2  •  topiramate (Topamax) 50 MG tablet, Take 1 tablet (50 mg total) by mouth daily for 7 days, THEN 1 tablet (50 mg total) 2 (two) times a day., Disp: 60 tablet, Rfl: 1  Allergies   Allergen Reactions   • Diphenhydramine      Other reaction(s): DIPHENHYDRAMINE (BENADRYL)   • Bee Venom Tongue Swelling   • Ciprofloxacin    • Doxycycline    • Sulfasalazine        OBJECTIVE    Vitals:   Vitals:    23 1448   BP: 136/86   Pulse: 93   Temp: 98.4 °F (36.9 °C)   SpO2: 98%   Weight: 88.9 kg (196 lb)   Height: 5' 6" (1.676 m)           Physical Exam  Vitals and nursing note reviewed. Constitutional:       General: She is not in acute distress. Appearance: Normal appearance. HENT:      Head: Normocephalic and atraumatic.       Right Ear: Tympanic membrane, ear canal and external ear normal.      Left Ear: Tympanic membrane, ear canal and external ear normal.      Nose: Nose normal.      Mouth/Throat:      Mouth: Mucous membranes are moist.      Pharynx: No oropharyngeal exudate or posterior oropharyngeal erythema. Eyes:      Conjunctiva/sclera: Conjunctivae normal.   Cardiovascular:      Rate and Rhythm: Normal rate and regular rhythm. Pulmonary:      Effort: Pulmonary effort is normal. No respiratory distress. Breath sounds: Normal breath sounds. Abdominal:      General: Bowel sounds are normal. There is no distension. Palpations: Abdomen is soft. Tenderness: There is no abdominal tenderness. Musculoskeletal:      Right lower leg: No edema. Left lower leg: No edema. Lymphadenopathy:      Cervical: No cervical adenopathy. Skin:     General: Skin is warm and dry. Neurological:      Mental Status: She is alert.       Comments: Grossly intact   Psychiatric:         Mood and Affect: Mood normal.                    Rochelle Akbar DO  [facility] [address] [facility]   11/27/2023  3:13 PM

## 2023-11-27 ENCOUNTER — OFFICE VISIT (OUTPATIENT)
Dept: FAMILY MEDICINE CLINIC | Facility: CLINIC | Age: 33
End: 2023-11-27
Payer: COMMERCIAL

## 2023-11-27 VITALS
TEMPERATURE: 98.4 F | SYSTOLIC BLOOD PRESSURE: 136 MMHG | WEIGHT: 196 LBS | BODY MASS INDEX: 31.5 KG/M2 | HEART RATE: 93 BPM | OXYGEN SATURATION: 98 % | HEIGHT: 66 IN | DIASTOLIC BLOOD PRESSURE: 86 MMHG

## 2023-11-27 DIAGNOSIS — J45.20 MILD INTERMITTENT ASTHMA WITHOUT COMPLICATION: ICD-10-CM

## 2023-11-27 DIAGNOSIS — Z91.09 ENVIRONMENTAL ALLERGIES: ICD-10-CM

## 2023-11-27 DIAGNOSIS — Z11.59 ENCOUNTER FOR HEPATITIS C SCREENING TEST FOR LOW RISK PATIENT: ICD-10-CM

## 2023-11-27 DIAGNOSIS — Z00.00 HEALTHCARE MAINTENANCE: ICD-10-CM

## 2023-11-27 DIAGNOSIS — Z13.1 SCREENING FOR DIABETES MELLITUS: ICD-10-CM

## 2023-11-27 DIAGNOSIS — G43.109 MIGRAINE WITH AURA AND WITHOUT STATUS MIGRAINOSUS, NOT INTRACTABLE: ICD-10-CM

## 2023-11-27 DIAGNOSIS — M06.9 RHEUMATOID ARTHRITIS INVOLVING BOTH KNEES, UNSPECIFIED WHETHER RHEUMATOID FACTOR PRESENT (HCC): ICD-10-CM

## 2023-11-27 DIAGNOSIS — F17.210 CIGARETTE NICOTINE DEPENDENCE WITHOUT COMPLICATION: ICD-10-CM

## 2023-11-27 DIAGNOSIS — Z13.220 SCREENING, LIPID: ICD-10-CM

## 2023-11-27 DIAGNOSIS — F41.1 GAD (GENERALIZED ANXIETY DISORDER): Primary | ICD-10-CM

## 2023-11-27 PROBLEM — J45.909 REACTIVE AIRWAY DISEASE: Status: RESOLVED | Noted: 2023-11-27 | Resolved: 2023-11-27

## 2023-11-27 PROBLEM — M25.561 ARTHRALGIA OF BOTH KNEES: Status: RESOLVED | Noted: 2018-06-04 | Resolved: 2023-11-27

## 2023-11-27 PROBLEM — J45.909 REACTIVE AIRWAY DISEASE: Status: ACTIVE | Noted: 2023-11-27

## 2023-11-27 PROBLEM — M25.562 ARTHRALGIA OF BOTH KNEES: Status: RESOLVED | Noted: 2018-06-04 | Resolved: 2023-11-27

## 2023-11-27 PROCEDURE — 99395 PREV VISIT EST AGE 18-39: CPT | Performed by: FAMILY MEDICINE

## 2023-11-27 PROCEDURE — 99214 OFFICE O/P EST MOD 30 MIN: CPT | Performed by: FAMILY MEDICINE

## 2023-11-27 RX ORDER — TOPIRAMATE 50 MG/1
TABLET, FILM COATED ORAL
Qty: 60 TABLET | Refills: 1 | Status: SHIPPED | OUTPATIENT
Start: 2023-11-27 | End: 2024-03-03

## 2023-11-27 RX ORDER — MELOXICAM 15 MG/1
15 TABLET ORAL DAILY PRN
Qty: 30 TABLET | Refills: 2 | Status: SHIPPED | OUTPATIENT
Start: 2023-11-27 | End: 2023-11-27 | Stop reason: SDUPTHER

## 2023-11-27 RX ORDER — ALBUTEROL SULFATE 90 UG/1
2 AEROSOL, METERED RESPIRATORY (INHALATION) EVERY 4 HOURS PRN
Qty: 18 G | Refills: 1 | Status: SHIPPED | OUTPATIENT
Start: 2023-11-27

## 2023-11-27 RX ORDER — ALBUTEROL SULFATE 90 UG/1
2 AEROSOL, METERED RESPIRATORY (INHALATION) EVERY 4 HOURS PRN
Qty: 18 G | Refills: 1 | Status: SHIPPED | OUTPATIENT
Start: 2023-11-27 | End: 2023-11-27 | Stop reason: SDUPTHER

## 2023-11-27 RX ORDER — BUSPIRONE HYDROCHLORIDE 15 MG/1
15 TABLET ORAL 2 TIMES DAILY
Qty: 180 TABLET | Refills: 1 | Status: SHIPPED | OUTPATIENT
Start: 2023-11-27

## 2023-11-27 RX ORDER — MELOXICAM 15 MG/1
15 TABLET ORAL DAILY PRN
Qty: 30 TABLET | Refills: 2 | Status: SHIPPED | OUTPATIENT
Start: 2023-11-27

## 2024-02-26 DIAGNOSIS — M06.9 RHEUMATOID ARTHRITIS INVOLVING BOTH KNEES, UNSPECIFIED WHETHER RHEUMATOID FACTOR PRESENT (HCC): ICD-10-CM

## 2024-02-26 DIAGNOSIS — G43.109 MIGRAINE WITH AURA AND WITHOUT STATUS MIGRAINOSUS, NOT INTRACTABLE: ICD-10-CM

## 2024-02-26 RX ORDER — MELOXICAM 15 MG/1
TABLET ORAL
Qty: 30 TABLET | Refills: 2 | Status: SHIPPED | OUTPATIENT
Start: 2024-02-26

## 2024-02-26 RX ORDER — TOPIRAMATE 50 MG/1
50 TABLET, FILM COATED ORAL 2 TIMES DAILY
Qty: 180 TABLET | Refills: 1 | Status: SHIPPED | OUTPATIENT
Start: 2024-02-26

## 2024-03-29 DIAGNOSIS — M06.9 RHEUMATOID ARTHRITIS INVOLVING BOTH KNEES, UNSPECIFIED WHETHER RHEUMATOID FACTOR PRESENT (HCC): ICD-10-CM

## 2024-03-29 RX ORDER — MELOXICAM 15 MG/1
15 TABLET ORAL DAILY
Qty: 90 TABLET | Refills: 1 | Status: SHIPPED | OUTPATIENT
Start: 2024-03-29

## 2024-06-19 DIAGNOSIS — F41.1 GAD (GENERALIZED ANXIETY DISORDER): ICD-10-CM

## 2024-06-19 RX ORDER — BUSPIRONE HYDROCHLORIDE 15 MG/1
15 TABLET ORAL 2 TIMES DAILY
Qty: 180 TABLET | Refills: 1 | Status: SHIPPED | OUTPATIENT
Start: 2024-06-19

## 2024-06-25 ENCOUNTER — VBI (OUTPATIENT)
Dept: ADMINISTRATIVE | Facility: OTHER | Age: 34
End: 2024-06-25

## 2024-06-25 NOTE — TELEPHONE ENCOUNTER
06/25/24 12:02 PM     Chart reviewed for Pap Smear (HPV) aka Cervical Cancer Screening ; nothing is submitted to the patient's insurance at this time.     Melissa Lewis   [facility]

## 2024-09-18 DIAGNOSIS — G43.109 MIGRAINE WITH AURA AND WITHOUT STATUS MIGRAINOSUS, NOT INTRACTABLE: ICD-10-CM

## 2024-09-18 RX ORDER — TOPIRAMATE 50 MG/1
50 TABLET, FILM COATED ORAL 2 TIMES DAILY
Qty: 60 TABLET | Refills: 0 | Status: SHIPPED | OUTPATIENT
Start: 2024-09-18 | End: 2024-09-19 | Stop reason: SDUPTHER

## 2024-09-19 DIAGNOSIS — G43.109 MIGRAINE WITH AURA AND WITHOUT STATUS MIGRAINOSUS, NOT INTRACTABLE: ICD-10-CM

## 2024-09-19 RX ORDER — TOPIRAMATE 50 MG/1
50 TABLET, FILM COATED ORAL 2 TIMES DAILY
Qty: 180 TABLET | Refills: 0 | Status: SHIPPED | OUTPATIENT
Start: 2024-09-19

## 2024-10-17 DIAGNOSIS — M06.9 RHEUMATOID ARTHRITIS INVOLVING BOTH KNEES, UNSPECIFIED WHETHER RHEUMATOID FACTOR PRESENT (HCC): ICD-10-CM

## 2024-10-18 RX ORDER — MELOXICAM 15 MG/1
15 TABLET ORAL DAILY
Qty: 30 TABLET | Refills: 0 | Status: SHIPPED | OUTPATIENT
Start: 2024-10-18 | End: 2024-10-21 | Stop reason: SDUPTHER

## 2024-10-21 DIAGNOSIS — M06.9 RHEUMATOID ARTHRITIS INVOLVING BOTH KNEES, UNSPECIFIED WHETHER RHEUMATOID FACTOR PRESENT (HCC): ICD-10-CM

## 2024-10-21 RX ORDER — MELOXICAM 15 MG/1
15 TABLET ORAL DAILY
Qty: 90 TABLET | Refills: 0 | Status: SHIPPED | OUTPATIENT
Start: 2024-10-21

## 2024-10-25 ENCOUNTER — TELEPHONE (OUTPATIENT)
Dept: FAMILY MEDICINE CLINIC | Facility: CLINIC | Age: 34
End: 2024-10-25

## 2024-10-25 NOTE — TELEPHONE ENCOUNTER
Left voicemail for pt as she is not due for physical until after 11/27. Detailed msg left to call back and reschedule to a later date of can keep the current date if there are other things she would like to discuss with provider.

## 2024-12-13 DIAGNOSIS — F41.1 GAD (GENERALIZED ANXIETY DISORDER): ICD-10-CM

## 2024-12-13 RX ORDER — BUSPIRONE HYDROCHLORIDE 15 MG/1
15 TABLET ORAL 2 TIMES DAILY
Qty: 60 TABLET | Refills: 0 | Status: SHIPPED | OUTPATIENT
Start: 2024-12-13

## 2025-01-13 DIAGNOSIS — F41.1 GAD (GENERALIZED ANXIETY DISORDER): ICD-10-CM

## 2025-01-13 DIAGNOSIS — G43.109 MIGRAINE WITH AURA AND WITHOUT STATUS MIGRAINOSUS, NOT INTRACTABLE: ICD-10-CM

## 2025-01-14 RX ORDER — BUSPIRONE HYDROCHLORIDE 15 MG/1
15 TABLET ORAL 2 TIMES DAILY
Qty: 60 TABLET | Refills: 0 | Status: SHIPPED | OUTPATIENT
Start: 2025-01-14

## 2025-01-14 RX ORDER — TOPIRAMATE 50 MG/1
50 TABLET, FILM COATED ORAL 2 TIMES DAILY
Qty: 60 TABLET | Refills: 0 | Status: SHIPPED | OUTPATIENT
Start: 2025-01-14

## 2025-01-26 DIAGNOSIS — M06.9 RHEUMATOID ARTHRITIS INVOLVING BOTH KNEES, UNSPECIFIED WHETHER RHEUMATOID FACTOR PRESENT (HCC): ICD-10-CM

## 2025-01-27 RX ORDER — MELOXICAM 15 MG/1
15 TABLET ORAL DAILY
Qty: 30 TABLET | Refills: 0 | Status: SHIPPED | OUTPATIENT
Start: 2025-01-27

## 2025-01-31 DIAGNOSIS — M06.9 RHEUMATOID ARTHRITIS INVOLVING BOTH KNEES, UNSPECIFIED WHETHER RHEUMATOID FACTOR PRESENT (HCC): ICD-10-CM

## 2025-01-31 RX ORDER — MELOXICAM 15 MG/1
15 TABLET ORAL DAILY
Qty: 90 TABLET | Refills: 0 | OUTPATIENT
Start: 2025-01-31

## 2025-02-04 NOTE — ASSESSMENT & PLAN NOTE
Symptoms overall stable with Buspar -- continue   Orders:  •  TSH, 3rd generation with Free T4 reflex; Future

## 2025-02-04 NOTE — ASSESSMENT & PLAN NOTE
Pt defers referral to Rheumatology. Discussed avoiding combining NSAIDs like Mobic and Naproxen given risk of kidney damage/GI erosion.

## 2025-02-04 NOTE — ASSESSMENT & PLAN NOTE
No exacerbation on exam -- will add Singulair for management of asthma/allergies and continue PRN MACK  Orders:  •  CBC and differential; Future  •  montelukast (SINGULAIR) 10 mg tablet; Take 1 tablet (10 mg total) by mouth daily at bedtime

## 2025-02-05 ENCOUNTER — OFFICE VISIT (OUTPATIENT)
Dept: FAMILY MEDICINE CLINIC | Facility: CLINIC | Age: 35
End: 2025-02-05
Payer: COMMERCIAL

## 2025-02-05 VITALS
WEIGHT: 174 LBS | OXYGEN SATURATION: 97 % | HEART RATE: 76 BPM | BODY MASS INDEX: 27.97 KG/M2 | TEMPERATURE: 97.5 F | DIASTOLIC BLOOD PRESSURE: 86 MMHG | SYSTOLIC BLOOD PRESSURE: 120 MMHG | HEIGHT: 66 IN

## 2025-02-05 DIAGNOSIS — Z00.00 HEALTHCARE MAINTENANCE: ICD-10-CM

## 2025-02-05 DIAGNOSIS — Z13.1 SCREENING FOR DIABETES MELLITUS: ICD-10-CM

## 2025-02-05 DIAGNOSIS — F17.210 CIGARETTE NICOTINE DEPENDENCE WITHOUT COMPLICATION: ICD-10-CM

## 2025-02-05 DIAGNOSIS — Z11.59 ENCOUNTER FOR HEPATITIS C SCREENING TEST FOR LOW RISK PATIENT: ICD-10-CM

## 2025-02-05 DIAGNOSIS — F41.1 GAD (GENERALIZED ANXIETY DISORDER): ICD-10-CM

## 2025-02-05 DIAGNOSIS — J45.20 MILD INTERMITTENT ASTHMA WITHOUT COMPLICATION: Primary | ICD-10-CM

## 2025-02-05 DIAGNOSIS — Z13.220 SCREENING, LIPID: ICD-10-CM

## 2025-02-05 DIAGNOSIS — M06.9 RHEUMATOID ARTHRITIS INVOLVING BOTH KNEES, UNSPECIFIED WHETHER RHEUMATOID FACTOR PRESENT (HCC): ICD-10-CM

## 2025-02-05 PROCEDURE — 99395 PREV VISIT EST AGE 18-39: CPT | Performed by: FAMILY MEDICINE

## 2025-02-05 PROCEDURE — 99214 OFFICE O/P EST MOD 30 MIN: CPT | Performed by: FAMILY MEDICINE

## 2025-02-05 RX ORDER — MONTELUKAST SODIUM 10 MG/1
10 TABLET ORAL
Qty: 90 TABLET | Refills: 1 | Status: SHIPPED | OUTPATIENT
Start: 2025-02-05

## 2025-02-13 DIAGNOSIS — F41.1 GAD (GENERALIZED ANXIETY DISORDER): ICD-10-CM

## 2025-02-13 DIAGNOSIS — G43.109 MIGRAINE WITH AURA AND WITHOUT STATUS MIGRAINOSUS, NOT INTRACTABLE: ICD-10-CM

## 2025-02-13 RX ORDER — BUSPIRONE HYDROCHLORIDE 15 MG/1
15 TABLET ORAL 2 TIMES DAILY
Qty: 180 TABLET | Refills: 1 | Status: SHIPPED | OUTPATIENT
Start: 2025-02-13

## 2025-02-13 RX ORDER — TOPIRAMATE 50 MG/1
50 TABLET, FILM COATED ORAL 2 TIMES DAILY
Qty: 60 TABLET | Refills: 0 | Status: SHIPPED | OUTPATIENT
Start: 2025-02-13

## 2025-02-27 DIAGNOSIS — M06.9 RHEUMATOID ARTHRITIS INVOLVING BOTH KNEES, UNSPECIFIED WHETHER RHEUMATOID FACTOR PRESENT (HCC): ICD-10-CM

## 2025-02-28 RX ORDER — MELOXICAM 15 MG/1
15 TABLET ORAL DAILY
Qty: 30 TABLET | Refills: 0 | Status: SHIPPED | OUTPATIENT
Start: 2025-02-28

## 2025-03-14 DIAGNOSIS — G43.109 MIGRAINE WITH AURA AND WITHOUT STATUS MIGRAINOSUS, NOT INTRACTABLE: ICD-10-CM

## 2025-03-14 RX ORDER — TOPIRAMATE 50 MG/1
50 TABLET, FILM COATED ORAL 2 TIMES DAILY
Qty: 180 TABLET | Refills: 1 | Status: SHIPPED | OUTPATIENT
Start: 2025-03-14

## 2025-03-28 DIAGNOSIS — M06.9 RHEUMATOID ARTHRITIS INVOLVING BOTH KNEES, UNSPECIFIED WHETHER RHEUMATOID FACTOR PRESENT (HCC): ICD-10-CM

## 2025-03-28 RX ORDER — MELOXICAM 15 MG/1
15 TABLET ORAL DAILY
Qty: 100 TABLET | Refills: 1 | Status: SHIPPED | OUTPATIENT
Start: 2025-03-28

## 2025-03-28 RX ORDER — MELOXICAM 15 MG/1
15 TABLET ORAL DAILY
Qty: 30 TABLET | Refills: 0 | Status: SHIPPED | OUTPATIENT
Start: 2025-03-28 | End: 2025-03-28

## 2025-04-16 ENCOUNTER — VBI (OUTPATIENT)
Dept: ADMINISTRATIVE | Facility: OTHER | Age: 35
End: 2025-04-16

## 2025-04-16 NOTE — TELEPHONE ENCOUNTER
04/16/25 1:27 PM     Chart reviewed for Pap Smear (HPV) aka Cervical Cancer Screening ; nothing is submitted to the patient's insurance at this time.     Melissa Lewis   [facility]

## 2025-08-08 ENCOUNTER — TELEPHONE (OUTPATIENT)
Age: 35
End: 2025-08-08